# Patient Record
Sex: FEMALE | Race: WHITE | Employment: UNEMPLOYED | URBAN - METROPOLITAN AREA
[De-identification: names, ages, dates, MRNs, and addresses within clinical notes are randomized per-mention and may not be internally consistent; named-entity substitution may affect disease eponyms.]

---

## 2018-10-25 ENCOUNTER — HOSPITAL ENCOUNTER (EMERGENCY)
Age: 33
Discharge: HOME | End: 2018-10-25
Payer: COMMERCIAL

## 2018-10-25 ENCOUNTER — APPOINTMENT (OUTPATIENT)
Dept: GENERAL RADIOLOGY | Age: 33
End: 2018-10-25
Payer: COMMERCIAL

## 2018-10-25 VITALS
OXYGEN SATURATION: 98 % | HEART RATE: 82 BPM | WEIGHT: 293 LBS | HEIGHT: 67 IN | SYSTOLIC BLOOD PRESSURE: 156 MMHG | DIASTOLIC BLOOD PRESSURE: 86 MMHG | TEMPERATURE: 98.6 F | BODY MASS INDEX: 45.99 KG/M2 | RESPIRATION RATE: 16 BRPM

## 2018-10-25 DIAGNOSIS — J40 BRONCHITIS: Primary | ICD-10-CM

## 2018-10-25 LAB
CHP ED QC CHECK: NORMAL
PREGNANCY TEST URINE, POC: NEGATIVE

## 2018-10-25 PROCEDURE — 94640 AIRWAY INHALATION TREATMENT: CPT

## 2018-10-25 PROCEDURE — 71046 X-RAY EXAM CHEST 2 VIEWS: CPT

## 2018-10-25 PROCEDURE — 99283 EMERGENCY DEPT VISIT LOW MDM: CPT

## 2018-10-25 PROCEDURE — 6370000000 HC RX 637 (ALT 250 FOR IP): Performed by: NURSE PRACTITIONER

## 2018-10-25 RX ORDER — ALBUTEROL SULFATE 90 UG/1
2 AEROSOL, METERED RESPIRATORY (INHALATION) EVERY 4 HOURS PRN
Qty: 1 INHALER | Refills: 1 | Status: SHIPPED | OUTPATIENT
Start: 2018-10-25 | End: 2020-12-21 | Stop reason: SDUPTHER

## 2018-10-25 RX ORDER — PREDNISONE 10 MG/1
40 TABLET ORAL DAILY
Qty: 20 TABLET | Refills: 0 | Status: SHIPPED | OUTPATIENT
Start: 2018-10-25 | End: 2018-10-30

## 2018-10-25 RX ORDER — IPRATROPIUM BROMIDE AND ALBUTEROL SULFATE 2.5; .5 MG/3ML; MG/3ML
1 SOLUTION RESPIRATORY (INHALATION) ONCE
Status: COMPLETED | OUTPATIENT
Start: 2018-10-25 | End: 2018-10-25

## 2018-10-25 RX ORDER — PREDNISONE 20 MG/1
60 TABLET ORAL ONCE
Status: COMPLETED | OUTPATIENT
Start: 2018-10-25 | End: 2018-10-25

## 2018-10-25 RX ORDER — BENZONATATE 100 MG/1
100 CAPSULE ORAL 3 TIMES DAILY PRN
Qty: 21 CAPSULE | Refills: 0 | Status: SHIPPED | OUTPATIENT
Start: 2018-10-25 | End: 2018-11-01

## 2018-10-25 RX ADMIN — IPRATROPIUM BROMIDE AND ALBUTEROL SULFATE 1 AMPULE: .5; 3 SOLUTION RESPIRATORY (INHALATION) at 16:28

## 2018-10-25 RX ADMIN — PREDNISONE 60 MG: 20 TABLET ORAL at 16:45

## 2018-10-25 NOTE — ED PROVIDER NOTES
°F (37 °C) Oral 90 16 98 % 5' 7\" (1.702 m) (!) 339 lb 7 oz (154 kg)      Oxygen Saturation Interpretation: Normal.    Constitutional:  Alert, development consistent with age. HEENT:  NC/NT. Airway patent. Neck:  Normal ROM. Supple. Respiratory:  Breath sounds: equal bilaterally. Lung sounds: Expiratory wheezing- upper right and upper left. CV:  Regular rate and rhythm, normal heart sounds, without pathological murmurs, ectopy, gallops, or rubs. .  GI:  Abdomen Soft, nontender, good bowel sounds. No firm or pulsatile mass. Integument:  Normal turgor. Warm, dry, without visible rash. Lymphatic:  Edema:  none Both lower extremity(s). Neurological:  Oriented. Motor functions intact. Lab / Imaging Results   (All laboratory and radiology results have been personally reviewed by myself)  Labs:  Results for orders placed or performed during the hospital encounter of 10/25/18   POC Pregnancy Urine Qual   Result Value Ref Range    Preg Test, Ur negative     QC OK? ok      Imaging: All Radiology results interpreted by Radiologist unless otherwise noted. XR CHEST STANDARD (2 VW)   Final Result   No acute cardiopulmonary disease. ED Course / Medical Decision Making     Medications   ipratropium-albuterol (DUONEB) nebulizer solution 1 ampule (1 ampule Inhalation Given 10/25/18 1628)   predniSONE (DELTASONE) tablet 60 mg (60 mg Oral Given 10/25/18 1645)      1750: Patient's symptoms improved after treatment. No respiratory distress. Consult(s):   None    Procedure(s):   none    Medical Decision Making:    Based on low  suspicion for pneumonia as per history/physical findings, imaging was done and confirms the above findings. Upper respiratory/Bronchitis infection likely viral in etiology. Not hypoxic, nothing to suggest pneumonia. Patient is well appearing, non toxic and appropriate for outpatient management. Plan is for symptom management with PCP follow up.    Plan of Care: Normal progression of disease discussed. All questions answered. Explained the rationale for symptomatic treatment rather than use of an antibiotic. Instruction provided in the use of fluids, vaporizer, acetaminophen, and other OTC medication for symptom control. Extra fluids  Analgesics as needed, dose reviewed. Follow up as needed should symptoms fail to improve. Counseling: The emergency provider has spoken with the patient and spouse/SO and discussed todays results, in addition to providing specific details for the plan of care and counseling regarding the diagnosis and prognosis. Questions are answered at this time and they are agreeable with the plan. Assessment      1. Bronchitis      Plan   Discharge to home  Patient condition is good    New Medications     New Prescriptions    ALBUTEROL SULFATE HFA (PROVENTIL HFA) 108 (90 BASE) MCG/ACT INHALER    Inhale 2 puffs into the lungs every 4 hours as needed for Wheezing    BENZONATATE (TESSALON PERLES) 100 MG CAPSULE    Take 1 capsule by mouth 3 times daily as needed for Cough    PREDNISONE (DELTASONE) 10 MG TABLET    Take 4 tablets by mouth daily for 5 days     Electronically signed by GEORGE Bowles CNP   DD: 10/25/18  **This report was transcribed using voice recognition software. Every effort was made to ensure accuracy; however, inadvertent computerized transcription errors may be present.   END OF ED PROVIDER NOTE     GEORGE Thomas CNP  10/25/18 6798

## 2018-12-14 ENCOUNTER — HOSPITAL ENCOUNTER (EMERGENCY)
Age: 33
Discharge: HOME | End: 2018-12-14
Payer: COMMERCIAL

## 2018-12-14 VITALS
BODY MASS INDEX: 51.69 KG/M2 | RESPIRATION RATE: 20 BRPM | OXYGEN SATURATION: 96 % | TEMPERATURE: 99.3 F | SYSTOLIC BLOOD PRESSURE: 130 MMHG | HEART RATE: 86 BPM | DIASTOLIC BLOOD PRESSURE: 84 MMHG | WEIGHT: 293 LBS

## 2018-12-14 DIAGNOSIS — J03.90 ACUTE TONSILLITIS, UNSPECIFIED ETIOLOGY: Primary | ICD-10-CM

## 2018-12-14 LAB
MONO TEST: NEGATIVE
STREP GRP A PCR: NEGATIVE

## 2018-12-14 PROCEDURE — 87880 STREP A ASSAY W/OPTIC: CPT

## 2018-12-14 PROCEDURE — 86308 HETEROPHILE ANTIBODY SCREEN: CPT

## 2018-12-14 PROCEDURE — 36415 COLL VENOUS BLD VENIPUNCTURE: CPT

## 2018-12-14 PROCEDURE — 99212 OFFICE O/P EST SF 10 MIN: CPT

## 2018-12-14 RX ORDER — AMOXICILLIN 500 MG/1
500 CAPSULE ORAL 3 TIMES DAILY
Qty: 30 CAPSULE | Refills: 0 | Status: SHIPPED | OUTPATIENT
Start: 2018-12-14 | End: 2018-12-24

## 2018-12-14 ASSESSMENT — PAIN SCALES - GENERAL: PAINLEVEL_OUTOF10: 9

## 2018-12-14 ASSESSMENT — PAIN DESCRIPTION - DESCRIPTORS: DESCRIPTORS: DISCOMFORT

## 2018-12-14 ASSESSMENT — PAIN DESCRIPTION - LOCATION: LOCATION: THROAT

## 2018-12-14 ASSESSMENT — PAIN DESCRIPTION - PAIN TYPE: TYPE: ACUTE PAIN

## 2019-10-04 ENCOUNTER — OFFICE VISIT (OUTPATIENT)
Dept: NEUROLOGY | Age: 34
End: 2019-10-04
Payer: MEDICAID

## 2019-10-04 VITALS
HEIGHT: 67 IN | RESPIRATION RATE: 14 BRPM | OXYGEN SATURATION: 98 % | HEART RATE: 61 BPM | DIASTOLIC BLOOD PRESSURE: 86 MMHG | SYSTOLIC BLOOD PRESSURE: 127 MMHG | WEIGHT: 293 LBS | BODY MASS INDEX: 45.99 KG/M2

## 2019-10-04 DIAGNOSIS — R51.9 MIXED HEADACHE: Primary | ICD-10-CM

## 2019-10-04 PROCEDURE — 1036F TOBACCO NON-USER: CPT | Performed by: NURSE PRACTITIONER

## 2019-10-04 PROCEDURE — 99204 OFFICE O/P NEW MOD 45 MIN: CPT | Performed by: NURSE PRACTITIONER

## 2019-10-04 PROCEDURE — G8484 FLU IMMUNIZE NO ADMIN: HCPCS | Performed by: NURSE PRACTITIONER

## 2019-10-04 PROCEDURE — G8417 CALC BMI ABV UP PARAM F/U: HCPCS | Performed by: NURSE PRACTITIONER

## 2019-10-04 PROCEDURE — G8427 DOCREV CUR MEDS BY ELIG CLIN: HCPCS | Performed by: NURSE PRACTITIONER

## 2019-10-04 RX ORDER — GLUCOSAMINE/D3/BOSWELLIA SERRA 1500MG-400
TABLET ORAL
COMMUNITY
End: 2022-04-25 | Stop reason: ALTCHOICE

## 2019-10-04 RX ORDER — FERROUS SULFATE 325(65) MG
325 TABLET ORAL
COMMUNITY
End: 2021-04-22 | Stop reason: ALTCHOICE

## 2019-10-04 RX ORDER — DIVALPROEX SODIUM 125 MG/1
125 TABLET, DELAYED RELEASE ORAL 2 TIMES DAILY
Qty: 60 TABLET | Refills: 0 | Status: SHIPPED | OUTPATIENT
Start: 2019-10-04 | End: 2019-10-04 | Stop reason: SDUPTHER

## 2019-10-04 RX ORDER — ARIPIPRAZOLE 5 MG/1
2.5 TABLET ORAL DAILY
COMMUNITY

## 2019-10-04 RX ORDER — CHOLECALCIFEROL (VITAMIN D3) 125 MCG
500 CAPSULE ORAL DAILY
COMMUNITY
End: 2019-12-06

## 2019-10-04 RX ORDER — DIVALPROEX SODIUM 125 MG/1
125 TABLET, DELAYED RELEASE ORAL 2 TIMES DAILY
Qty: 60 TABLET | Refills: 2 | Status: SHIPPED | OUTPATIENT
Start: 2019-10-04 | End: 2019-12-06 | Stop reason: SDUPTHER

## 2019-10-16 ENCOUNTER — HOSPITAL ENCOUNTER (OUTPATIENT)
Dept: CT IMAGING | Age: 34
Discharge: HOME | End: 2019-10-18
Payer: MEDICAID

## 2019-10-16 DIAGNOSIS — R51.9 MIXED HEADACHE: ICD-10-CM

## 2019-10-16 PROCEDURE — 70450 CT HEAD/BRAIN W/O DYE: CPT

## 2019-10-21 ENCOUNTER — TELEPHONE (OUTPATIENT)
Dept: NEUROLOGY | Age: 34
End: 2019-10-21

## 2019-10-31 LAB — PAP SMEAR, EXTERNAL: NEGATIVE

## 2019-12-06 ENCOUNTER — OFFICE VISIT (OUTPATIENT)
Dept: NEUROLOGY | Age: 34
End: 2019-12-06
Payer: MEDICAID

## 2019-12-06 VITALS
DIASTOLIC BLOOD PRESSURE: 82 MMHG | BODY MASS INDEX: 45.99 KG/M2 | HEIGHT: 67 IN | SYSTOLIC BLOOD PRESSURE: 122 MMHG | OXYGEN SATURATION: 97 % | WEIGHT: 293 LBS | HEART RATE: 69 BPM | RESPIRATION RATE: 15 BRPM

## 2019-12-06 DIAGNOSIS — R51.9 MIXED HEADACHE: Primary | ICD-10-CM

## 2019-12-06 PROCEDURE — G8484 FLU IMMUNIZE NO ADMIN: HCPCS | Performed by: NURSE PRACTITIONER

## 2019-12-06 PROCEDURE — 1036F TOBACCO NON-USER: CPT | Performed by: NURSE PRACTITIONER

## 2019-12-06 PROCEDURE — G8417 CALC BMI ABV UP PARAM F/U: HCPCS | Performed by: NURSE PRACTITIONER

## 2019-12-06 PROCEDURE — G8427 DOCREV CUR MEDS BY ELIG CLIN: HCPCS | Performed by: NURSE PRACTITIONER

## 2019-12-06 PROCEDURE — 99214 OFFICE O/P EST MOD 30 MIN: CPT | Performed by: NURSE PRACTITIONER

## 2019-12-06 RX ORDER — DICYCLOMINE HCL 20 MG
20 TABLET ORAL 2 TIMES DAILY
COMMUNITY
End: 2022-04-25 | Stop reason: ALTCHOICE

## 2019-12-06 RX ORDER — LOPERAMIDE HYDROCHLORIDE 2 MG/1
2 CAPSULE ORAL PRN
COMMUNITY

## 2019-12-06 RX ORDER — FLUOXETINE HYDROCHLORIDE 40 MG/1
40 CAPSULE ORAL DAILY
COMMUNITY

## 2019-12-06 RX ORDER — DIVALPROEX SODIUM 125 MG/1
125 TABLET, DELAYED RELEASE ORAL 2 TIMES DAILY
Qty: 60 TABLET | Refills: 6 | Status: SHIPPED
Start: 2019-12-06 | End: 2020-05-29 | Stop reason: SDUPTHER

## 2020-05-29 ENCOUNTER — OFFICE VISIT (OUTPATIENT)
Dept: NEUROLOGY | Age: 35
End: 2020-05-29
Payer: MEDICAID

## 2020-05-29 VITALS
TEMPERATURE: 97.6 F | BODY MASS INDEX: 45.99 KG/M2 | HEART RATE: 74 BPM | HEIGHT: 67 IN | SYSTOLIC BLOOD PRESSURE: 110 MMHG | WEIGHT: 293 LBS | RESPIRATION RATE: 20 BRPM | DIASTOLIC BLOOD PRESSURE: 76 MMHG | OXYGEN SATURATION: 100 %

## 2020-05-29 PROCEDURE — 1036F TOBACCO NON-USER: CPT | Performed by: NURSE PRACTITIONER

## 2020-05-29 PROCEDURE — G8427 DOCREV CUR MEDS BY ELIG CLIN: HCPCS | Performed by: NURSE PRACTITIONER

## 2020-05-29 PROCEDURE — G8417 CALC BMI ABV UP PARAM F/U: HCPCS | Performed by: NURSE PRACTITIONER

## 2020-05-29 PROCEDURE — 99213 OFFICE O/P EST LOW 20 MIN: CPT | Performed by: NURSE PRACTITIONER

## 2020-05-29 RX ORDER — DIVALPROEX SODIUM 125 MG/1
125 TABLET, DELAYED RELEASE ORAL 2 TIMES DAILY
Qty: 60 TABLET | Refills: 11 | Status: SHIPPED
Start: 2020-05-29 | End: 2021-04-22

## 2020-05-29 NOTE — PROGRESS NOTES
estrogens,    Nifedipine)? no  Exposure to carbon monoxide? no  Substance use: not anymore    Sleep:patient is now sleeping better now with 7-9 hours per night can sleep through the night    No issues with eating, chewing or swallowing.     No chest pain or palpitations  No SOB  No vertigo, lightheadedness or loss of consciousness  No falls, tripping or stumbling  No incontinence of bowels or bladder  No itching or bruising appreciated  No numbness, tingling or focal arm/leg weakness    ROS is otherwise negative    Objective:     Vitals:    05/29/20 1506   BP: 110/76   Site: Right Upper Arm   Position: Sitting   Cuff Size: Large Adult   Pulse: 74   Resp: 20   Temp: 97.6 °F (36.4 °C)   TempSrc: Oral   SpO2: 100%   Weight: (!) 315 lb (142.9 kg)   Height: 5' 7\" (1.702 m)     General appearance: alert, appears stated age, cooperative and in no distress  Head: normocephalic, without obvious abnormality, atraumatic  Eyes: conjunctivae/corneas clear; no drainage  Neck: no adenopathy, supple, symmetrical  Lungs: clear to auscultation bilaterally  Heart: regular rate and rhythm, S1, S2 normal, no murmur  Abdomen: soft, non-tender; bowel sounds normal  Extremities: normal, atraumatic, no cyanosis or edema  Pulses: 2+ and symmetric  Skin:  color, texture, turgor normal--no rashes or lesions      Mental Status: alert and oriented x 4    Appropriate attention/concentration  Intact fundus of knowledge      Speech: no dysarthria  Language: no aphasias    Cranial Nerves:  I: smell    II: visual acuity     II: visual fields Full    II: pupils PATTI   III,VII: ptosis None   III,IV,VI: extraocular muscles  EOMI without nystagmus   V: mastication Normal   V: facial light touch sensation  Normal   V,VII: corneal reflex     VII: facial muscle function - upper  Normal   VII: facial muscle function - lower Normal   VIII: hearing Normal   IX: soft palate elevation  Normal   IX,X: gag reflex    XI: trapezius strength  5/5   XI: sternocleidomastoid strength 5/5   XI: neck extension strength  5/5   XII: tongue strength  Normal     Motor:  5/5 throughout  Normal bulk and tone  No drift   No abnormal movements    Sensory:  LT normal    Coordination:   FN, FFM and GABO normal  HS normal    Gait:  Normal    DTR:   2+ throughout    No Medina's    No other pathological reflexes    Laboratory/Radiology:  ry/Radiology:     No labs to review at this time    CT of head: Negative with no acute findings or abnormalities    Imaging studies were reviewed independently today    Assessment:     Patient with mixed headaches--- migraines without aura and tension headaches  --- No previous history of headaches but with a history of migraines on the maternal side  --- Has was having 3 headaches per week or 12 headaches per month  ---her headaches are bilateral with photophobia, phonophobia, scalp sensitivity but with no nausea/vomiting  --- Started her on Depakote 125 mg twice daily and now with significant decrease in her headaches  --- Now with 0 to 1/2 headache per week  --- No complaints of side effects from Depakote  --- CT of head: Was negative for any acute findings or abnormalities  --- She is medically stable  --- bipolar disorder controlled with antidepressants/antipsychotics  --- Neuro exam was nonfocal    Plan:     Continue Depakote 125 mg BID    Follow-up in 1 year    Call with any questions or concerns      GEORGE Crabtree, HARINIP-C  3:05 PM  5/29/2020

## 2020-12-21 ENCOUNTER — OFFICE VISIT (OUTPATIENT)
Dept: FAMILY MEDICINE CLINIC | Age: 35
End: 2020-12-21
Payer: MEDICAID

## 2020-12-21 VITALS
OXYGEN SATURATION: 98 % | HEART RATE: 98 BPM | HEIGHT: 67 IN | BODY MASS INDEX: 45.99 KG/M2 | TEMPERATURE: 98.5 F | SYSTOLIC BLOOD PRESSURE: 113 MMHG | RESPIRATION RATE: 16 BRPM | WEIGHT: 293 LBS | DIASTOLIC BLOOD PRESSURE: 70 MMHG

## 2020-12-21 PROBLEM — E66.01 CLASS 3 SEVERE OBESITY DUE TO EXCESS CALORIES WITHOUT SERIOUS COMORBIDITY WITH BODY MASS INDEX (BMI) OF 50.0 TO 59.9 IN ADULT (HCC): Status: ACTIVE | Noted: 2020-12-21

## 2020-12-21 PROBLEM — F60.3 BORDERLINE PERSONALITY DISORDER (HCC): Status: ACTIVE | Noted: 2020-12-21

## 2020-12-21 PROBLEM — Z91.09 ENVIRONMENTAL ALLERGIES: Status: ACTIVE | Noted: 2020-12-21

## 2020-12-21 PROBLEM — E66.813 CLASS 3 SEVERE OBESITY DUE TO EXCESS CALORIES WITHOUT SERIOUS COMORBIDITY WITH BODY MASS INDEX (BMI) OF 50.0 TO 59.9 IN ADULT: Status: ACTIVE | Noted: 2020-12-21

## 2020-12-21 PROBLEM — J45.30 MILD PERSISTENT ASTHMA WITHOUT COMPLICATION: Status: ACTIVE | Noted: 2020-12-21

## 2020-12-21 PROCEDURE — G8482 FLU IMMUNIZE ORDER/ADMIN: HCPCS | Performed by: INTERNAL MEDICINE

## 2020-12-21 PROCEDURE — 99203 OFFICE O/P NEW LOW 30 MIN: CPT | Performed by: INTERNAL MEDICINE

## 2020-12-21 PROCEDURE — 1036F TOBACCO NON-USER: CPT | Performed by: INTERNAL MEDICINE

## 2020-12-21 PROCEDURE — 90471 IMMUNIZATION ADMIN: CPT | Performed by: INTERNAL MEDICINE

## 2020-12-21 PROCEDURE — 90686 IIV4 VACC NO PRSV 0.5 ML IM: CPT | Performed by: INTERNAL MEDICINE

## 2020-12-21 PROCEDURE — G8427 DOCREV CUR MEDS BY ELIG CLIN: HCPCS | Performed by: INTERNAL MEDICINE

## 2020-12-21 PROCEDURE — G8431 POS CLIN DEPRES SCRN F/U DOC: HCPCS | Performed by: INTERNAL MEDICINE

## 2020-12-21 PROCEDURE — G8417 CALC BMI ABV UP PARAM F/U: HCPCS | Performed by: INTERNAL MEDICINE

## 2020-12-21 RX ORDER — FEXOFENADINE HCL 180 MG/1
180 TABLET ORAL DAILY
Qty: 90 TABLET | Refills: 1 | Status: SHIPPED | OUTPATIENT
Start: 2020-12-21

## 2020-12-21 RX ORDER — ALBUTEROL SULFATE 90 UG/1
2 AEROSOL, METERED RESPIRATORY (INHALATION) EVERY 4 HOURS PRN
Qty: 1 INHALER | Refills: 1 | Status: SHIPPED
Start: 2020-12-21 | End: 2022-07-25 | Stop reason: SDUPTHER

## 2020-12-21 RX ORDER — BUDESONIDE AND FORMOTEROL FUMARATE DIHYDRATE 160; 4.5 UG/1; UG/1
2 AEROSOL RESPIRATORY (INHALATION) 2 TIMES DAILY
Qty: 3 INHALER | Refills: 1 | Status: SHIPPED
Start: 2020-12-21 | End: 2022-07-25 | Stop reason: SDUPTHER

## 2020-12-21 SDOH — ECONOMIC STABILITY: TRANSPORTATION INSECURITY
IN THE PAST 12 MONTHS, HAS LACK OF TRANSPORTATION KEPT YOU FROM MEETINGS, WORK, OR FROM GETTING THINGS NEEDED FOR DAILY LIVING?: YES

## 2020-12-21 SDOH — ECONOMIC STABILITY: FOOD INSECURITY: WITHIN THE PAST 12 MONTHS, YOU WORRIED THAT YOUR FOOD WOULD RUN OUT BEFORE YOU GOT MONEY TO BUY MORE.: SOMETIMES TRUE

## 2020-12-21 SDOH — ECONOMIC STABILITY: FOOD INSECURITY: WITHIN THE PAST 12 MONTHS, THE FOOD YOU BOUGHT JUST DIDN'T LAST AND YOU DIDN'T HAVE MONEY TO GET MORE.: NEVER TRUE

## 2020-12-21 SDOH — ECONOMIC STABILITY: INCOME INSECURITY: HOW HARD IS IT FOR YOU TO PAY FOR THE VERY BASICS LIKE FOOD, HOUSING, MEDICAL CARE, AND HEATING?: SOMEWHAT HARD

## 2020-12-21 SDOH — ECONOMIC STABILITY: TRANSPORTATION INSECURITY
IN THE PAST 12 MONTHS, HAS THE LACK OF TRANSPORTATION KEPT YOU FROM MEDICAL APPOINTMENTS OR FROM GETTING MEDICATIONS?: NO

## 2020-12-21 ASSESSMENT — ENCOUNTER SYMPTOMS
WHEEZING: 1
COUGH: 0
VOMITING: 0
COLOR CHANGE: 0
DIARRHEA: 0
TROUBLE SWALLOWING: 0
NAUSEA: 0
SHORTNESS OF BREATH: 1
SORE THROAT: 0

## 2020-12-21 ASSESSMENT — PATIENT HEALTH QUESTIONNAIRE - PHQ9
5. POOR APPETITE OR OVEREATING: 3
2. FEELING DOWN, DEPRESSED OR HOPELESS: 3
10. IF YOU CHECKED OFF ANY PROBLEMS, HOW DIFFICULT HAVE THESE PROBLEMS MADE IT FOR YOU TO DO YOUR WORK, TAKE CARE OF THINGS AT HOME, OR GET ALONG WITH OTHER PEOPLE: 3
7. TROUBLE CONCENTRATING ON THINGS, SUCH AS READING THE NEWSPAPER OR WATCHING TELEVISION: 3
SUM OF ALL RESPONSES TO PHQ9 QUESTIONS 1 & 2: 4
SUM OF ALL RESPONSES TO PHQ QUESTIONS 1-9: 21
SUM OF ALL RESPONSES TO PHQ QUESTIONS 1-9: 22
6. FEELING BAD ABOUT YOURSELF - OR THAT YOU ARE A FAILURE OR HAVE LET YOURSELF OR YOUR FAMILY DOWN: 3
3. TROUBLE FALLING OR STAYING ASLEEP: 3
1. LITTLE INTEREST OR PLEASURE IN DOING THINGS: 1
8. MOVING OR SPEAKING SO SLOWLY THAT OTHER PEOPLE COULD HAVE NOTICED. OR THE OPPOSITE, BEING SO FIGETY OR RESTLESS THAT YOU HAVE BEEN MOVING AROUND A LOT MORE THAN USUAL: 2
9. THOUGHTS THAT YOU WOULD BE BETTER OFF DEAD, OR OF HURTING YOURSELF: 1
SUM OF ALL RESPONSES TO PHQ QUESTIONS 1-9: 22
4. FEELING TIRED OR HAVING LITTLE ENERGY: 3

## 2020-12-21 ASSESSMENT — COLUMBIA-SUICIDE SEVERITY RATING SCALE - C-SSRS
1. WITHIN THE PAST MONTH, HAVE YOU WISHED YOU WERE DEAD OR WISHED YOU COULD GO TO SLEEP AND NOT WAKE UP?: NO
6. HAVE YOU EVER DONE ANYTHING, STARTED TO DO ANYTHING, OR PREPARED TO DO ANYTHING TO END YOUR LIFE?: NO
2. HAVE YOU ACTUALLY HAD ANY THOUGHTS OF KILLING YOURSELF?: NO

## 2020-12-21 NOTE — PROGRESS NOTES
12/21/2020    Chief Complaint   Patient presents with    Establish Care    Asthma     getting worse since last 4-5 mos       HPI  Shortness of breath and wheezing. Not SOB at rest. Using albuterol MDI 2-3 days out of week. Dyspnea is mild last minutes brought on by walking relieved by rest and albuterol and has been occurring over the past few months. Has environmental allergies controlled w/ Allegra but has not had money lately to purchase the medication so she has had a lot of sneezing. She has a history of borderline personality disorder and is not aware of whether she has a diagnosis of depression but she is to see a psychiatrist within the week to reestablish and get back on her medications. 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 She would do a lot of walking on the job and is no longer doing that. Review of Systems   Constitutional: Negative for chills, diaphoresis, fever and unexpected weight change (wt gain due to sedentary life after job loss). HENT: Negative for sore throat and trouble swallowing. Respiratory: Positive for shortness of breath and wheezing. Negative for cough. Cardiovascular: Positive for leg swelling. Negative for chest pain. Gastrointestinal: Negative for diarrhea, nausea and vomiting. Genitourinary: Negative for hematuria. Musculoskeletal: Negative for gait problem and joint swelling. Skin: Negative for color change and wound. Neurological: Negative for syncope, facial asymmetry and light-headedness. Hematological: Negative for adenopathy. Psychiatric/Behavioral: Negative for confusion, decreased concentration and self-injury.        Past Medical History:   Diagnosis Date    Asthma     Personality disorder ([facility] [address]        Social History     Tobacco Use    Smoking status: Former Smoker    Smokeless tobacco: Never Used   Substance Use Topics    Alcohol use: Yes     Comment: rare     Social History     Substance and Sexual Activity   Drug Use No       Past surgical, family, social history reviewed and updated. /70   Pulse 98   Temp 98.5 °F (36.9 °C) (Temporal)   Resp 16   Ht 5' 7\" (1.702 m)   Wt (!) 365 lb 9.6 oz (165.8 kg)   LMP 12/17/2020 (Exact Date)   SpO2 98%   BMI 57.26 kg/m²     Physical Exam  Vitals signs reviewed. Constitutional:       General: She is not in acute distress. Appearance: Normal appearance. She is not diaphoretic. HENT:      Mouth/Throat:      Mouth: Mucous membranes are moist.      Pharynx: Oropharynx is clear. No oropharyngeal exudate or posterior oropharyngeal erythema. Eyes:      General: No scleral icterus. Conjunctiva/sclera: Conjunctivae normal.   Neck:      Thyroid: No thyromegaly. Vascular: No JVD (at 45 degrees inclination). Cardiovascular:      Rate and Rhythm: Normal rate and regular rhythm. Heart sounds: Normal heart sounds. No murmur. No friction rub. No gallop. Pulmonary:      Breath sounds: Normal breath sounds. No stridor. No wheezing, rhonchi or rales. Abdominal:      General: There is no abdominal bruit. Musculoskeletal:      Right lower leg: Edema present. Left lower leg: Edema present. Comments: nonpitting   Skin:     General: Skin is warm and dry. Coloration: Skin is not jaundiced. Neurological:      General: No focal deficit present. Mental Status: She is alert and oriented to person, place, and time. Gait: Gait normal.   Psychiatric:         Thought Content: Thought content normal.       1. Mild persistent asthma without complication  Not controlled. Begin Symbicort as needed  - budesonide-formoterol (SYMBICORT) 160-4.5 MCG/ACT AERO; Inhale 2 puffs into the lungs 2 times daily  Dispense: 3 Inhaler; Refill: 1  - albuterol sulfate HFA (PROVENTIL HFA) 108 (90 Base) MCG/ACT inhaler;  Inhale 2 puffs into the lungs every 4 hours as needed for Wheezing  Dispense: 1

## 2020-12-23 DIAGNOSIS — E66.01 CLASS 3 SEVERE OBESITY DUE TO EXCESS CALORIES WITHOUT SERIOUS COMORBIDITY WITH BODY MASS INDEX (BMI) OF 50.0 TO 59.9 IN ADULT (HCC): ICD-10-CM

## 2020-12-23 DIAGNOSIS — J45.30 MILD PERSISTENT ASTHMA WITHOUT COMPLICATION: ICD-10-CM

## 2020-12-23 LAB
GLUCOSE FASTING: 86 MG/DL (ref 74–99)
TSH SERPL DL<=0.05 MIU/L-ACNC: 1.47 UIU/ML (ref 0.27–4.2)

## 2021-01-04 ENCOUNTER — TELEPHONE (OUTPATIENT)
Dept: FAMILY MEDICINE CLINIC | Age: 36
End: 2021-01-04

## 2021-01-22 ENCOUNTER — VIRTUAL VISIT (OUTPATIENT)
Dept: FAMILY MEDICINE CLINIC | Age: 36
End: 2021-01-22
Payer: MEDICAID

## 2021-01-22 DIAGNOSIS — Z91.09 ENVIRONMENTAL ALLERGIES: ICD-10-CM

## 2021-01-22 DIAGNOSIS — J45.30 MILD PERSISTENT ASTHMA WITHOUT COMPLICATION: Primary | ICD-10-CM

## 2021-01-22 DIAGNOSIS — E66.01 CLASS 3 SEVERE OBESITY DUE TO EXCESS CALORIES WITHOUT SERIOUS COMORBIDITY WITH BODY MASS INDEX (BMI) OF 50.0 TO 59.9 IN ADULT (HCC): ICD-10-CM

## 2021-01-22 PROCEDURE — 99442 PR PHYS/QHP TELEPHONE EVALUATION 11-20 MIN: CPT | Performed by: INTERNAL MEDICINE

## 2021-01-22 ASSESSMENT — ENCOUNTER SYMPTOMS
COUGH: 0
CHEST TIGHTNESS: 0
SHORTNESS OF BREATH: 0
WHEEZING: 0

## 2021-01-22 NOTE — PROGRESS NOTES
2021    TELEHEALTH EVALUATION -- Audio/Visual (During [facility]-46 public health emergency)    HPI:    Zamzam Erazo (:  1985) has requested an audio/video evaluation for the following concern(s):    Asthma  Feeling much better w/ Symbicort. No day or nightime asthma symptoms. Last used albuterol 1 wk ago. Reconnected w/ her psychiatrist and doing well. Not getting allergy medication due to cost and so is having some postnasal drip and congestion. Review of Systems   Respiratory: Negative for cough, chest tightness, shortness of breath and wheezing. Cardiovascular: Negative for chest pain. Prior to Visit Medications    Medication Sig Taking?  Authorizing Provider   budesonide-formoterol (SYMBICORT) 160-4.5 MCG/ACT AERO Inhale 2 puffs into the lungs 2 times daily Yes Anna Bradford MD   albuterol sulfate HFA (PROVENTIL HFA) 108 (90 Base) MCG/ACT inhaler Inhale 2 puffs into the lungs every 4 hours as needed for Wheezing Yes Anna Bradford MD   divalproex (DEPAKOTE) 125 MG DR tablet Take 1 tablet by mouth 2 times daily Yes Jv Shah, APRN - CNP   FLUoxetine (PROZAC) 40 MG capsule Take 20 mg by mouth daily  Yes Historical Provider, MD   dicyclomine (BENTYL) 20 MG tablet Take 20 mg by mouth 2 times daily Yes Historical Provider, MD   loperamide (IMODIUM) 2 MG capsule Take 2 mg by mouth as needed for Diarrhea Yes Historical Provider, MD   ARIPiprazole (ABILIFY) 5 MG tablet Take 5 mg by mouth daily Yes Historical Provider, MD   Biotin 71002 MCG TABS Take by mouth Yes Historical Provider, MD   fexofenadine (ALLEGRA) 180 MG tablet Take 1 tablet by mouth daily  Patient not taking: Reported on 2021  Anna Bradford MD   ferrous sulfate 325 (65 Fe) MG tablet Take 325 mg by mouth daily (with breakfast)  Historical Provider, MD       Social History     Tobacco Use    Smoking status: Former Smoker    Smokeless tobacco: Never Used   Substance Use Topics She has not yet resumed daily walking like she had planned once asthma is controlled, but she states she will.      return 3 months, office    Shankar Mullins is a 37 y.o. female being evaluated by a Virtual Visit (video visit) encounter to address concerns as mentioned above. A caregiver was present when appropriate. Due to this being a TeleHealth encounter (During [facility] public health emergency), evaluation of the following organ systems was limited: Vitals/Constitutional/EENT/Resp/CV/GI//MS/Neuro/Skin/Heme-Lymph-Imm. Pursuant to the emergency declaration under the [address] [place] [address], [address] [place] [address] and the [employer] and [employer] Act, this Virtual Visit was conducted with patient's (and/or legal guardian's) consent, to reduce the patient's risk of exposure to COVID-19 and provide necessary medical care. The patient (and/or legal guardian) has also been advised to contact this office for worsening conditions or problems, and seek emergency medical treatment and/or call 911 if deemed necessary. Patient identification was verified at the start of the visit: Yes    Total time spent on this encounter: 11 min    Services were provided through a video synchronous discussion virtually to substitute for in-person clinic visit. Patient and provider were located at their individual homes. --Eneida Monroe MD on 1/22/2021 at 10:18 AM    An electronic signature was used to authenticate this note.

## 2021-04-22 ENCOUNTER — OFFICE VISIT (OUTPATIENT)
Dept: FAMILY MEDICINE CLINIC | Age: 36
End: 2021-04-22
Payer: MEDICAID

## 2021-04-22 VITALS
DIASTOLIC BLOOD PRESSURE: 70 MMHG | RESPIRATION RATE: 16 BRPM | BODY MASS INDEX: 45.99 KG/M2 | SYSTOLIC BLOOD PRESSURE: 118 MMHG | HEART RATE: 99 BPM | OXYGEN SATURATION: 99 % | TEMPERATURE: 97.7 F | WEIGHT: 293 LBS | HEIGHT: 67 IN

## 2021-04-22 DIAGNOSIS — E66.01 CLASS 3 SEVERE OBESITY DUE TO EXCESS CALORIES WITHOUT SERIOUS COMORBIDITY WITH BODY MASS INDEX (BMI) OF 50.0 TO 59.9 IN ADULT (HCC): Primary | ICD-10-CM

## 2021-04-22 DIAGNOSIS — J45.30 MILD PERSISTENT ASTHMA WITHOUT COMPLICATION: ICD-10-CM

## 2021-04-22 PROCEDURE — 1036F TOBACCO NON-USER: CPT | Performed by: INTERNAL MEDICINE

## 2021-04-22 PROCEDURE — G8427 DOCREV CUR MEDS BY ELIG CLIN: HCPCS | Performed by: INTERNAL MEDICINE

## 2021-04-22 PROCEDURE — G8417 CALC BMI ABV UP PARAM F/U: HCPCS | Performed by: INTERNAL MEDICINE

## 2021-04-22 PROCEDURE — 99213 OFFICE O/P EST LOW 20 MIN: CPT | Performed by: INTERNAL MEDICINE

## 2021-04-22 ASSESSMENT — PATIENT HEALTH QUESTIONNAIRE - PHQ9
SUM OF ALL RESPONSES TO PHQ9 QUESTIONS 1 & 2: 2
SUM OF ALL RESPONSES TO PHQ QUESTIONS 1-9: 2
2. FEELING DOWN, DEPRESSED OR HOPELESS: 1

## 2021-04-22 NOTE — PROGRESS NOTES
4/22/2021  Visit type: Established patient    Reason for Visit: Follow-up (asthma) and Weight Loss (would like to discuss)      ASSESSMENT AND PLAN      1. Class 3 severe obesity due to excess calories without serious comorbidity with body mass index (BMI) of 50.0 to 59.9 in adult [facility])  Discussed options including return to Weight [employer], Noom or other ashley, medication, medical or surgical weight loss program referral, nutritionist. She prefers latter to start with. Suggested she avoid presweetened drinks. - Amb Referral to Nutrition Services    2. Mild persistent asthma without complication  Stable- continue present management. Recommend COVID vaccine, provided phone #.    ----------------------------------------------------------------------    SUBJECTIVE    Chief Complaint   Patient presents with    Follow-up     asthma    Weight Loss     would like to discuss     HPI   Asthma:  Using albuterol once weekly. No nocturnal symptoms. She is frustrated by her weight. Weight Watchers helped in past but cost is a problem. Lives with boyfriend and they eat too much restaurant food and drink presweetened drinks. Review of Systems   Denies fever, chills, chest pain, and shortness of breath     OBJECTIVE    /70   Pulse 99   Temp 97.7 °F (36.5 °C) (Temporal)   Resp 16   Ht 5' 7\" (1.702 m)   Wt (!) 386 lb 14.4 oz (175.5 kg)   LMP 04/06/2021 (Exact Date)   SpO2 99%   BMI 60.60 kg/m²   Physical Exam  Vitals signs reviewed. Constitutional:       General: She is not in acute distress. Pulmonary:      Effort: No respiratory distress.    Neurological:      Mental Status: She is alert.          ---------------------------------------------------------------------------

## 2021-10-12 ENCOUNTER — OFFICE VISIT (OUTPATIENT)
Dept: FAMILY MEDICINE CLINIC | Age: 36
End: 2021-10-12
Payer: MEDICAID

## 2021-10-12 VITALS
OXYGEN SATURATION: 98 % | HEIGHT: 67 IN | HEART RATE: 76 BPM | BODY MASS INDEX: 45.99 KG/M2 | DIASTOLIC BLOOD PRESSURE: 72 MMHG | SYSTOLIC BLOOD PRESSURE: 123 MMHG | TEMPERATURE: 97.5 F | WEIGHT: 293 LBS | RESPIRATION RATE: 16 BRPM

## 2021-10-12 DIAGNOSIS — F60.3 BORDERLINE PERSONALITY DISORDER (HCC): ICD-10-CM

## 2021-10-12 DIAGNOSIS — E66.01 CLASS 3 SEVERE OBESITY DUE TO EXCESS CALORIES WITHOUT SERIOUS COMORBIDITY WITH BODY MASS INDEX (BMI) OF 50.0 TO 59.9 IN ADULT (HCC): ICD-10-CM

## 2021-10-12 DIAGNOSIS — Z23 FLU VACCINE NEED: ICD-10-CM

## 2021-10-12 DIAGNOSIS — K21.9 GASTROESOPHAGEAL REFLUX DISEASE, UNSPECIFIED WHETHER ESOPHAGITIS PRESENT: Primary | ICD-10-CM

## 2021-10-12 DIAGNOSIS — J45.30 MILD PERSISTENT ASTHMA WITHOUT COMPLICATION: ICD-10-CM

## 2021-10-12 PROCEDURE — 99214 OFFICE O/P EST MOD 30 MIN: CPT | Performed by: INTERNAL MEDICINE

## 2021-10-12 PROCEDURE — 90471 IMMUNIZATION ADMIN: CPT | Performed by: INTERNAL MEDICINE

## 2021-10-12 PROCEDURE — 90674 CCIIV4 VAC NO PRSV 0.5 ML IM: CPT | Performed by: INTERNAL MEDICINE

## 2021-10-12 PROCEDURE — G8417 CALC BMI ABV UP PARAM F/U: HCPCS | Performed by: INTERNAL MEDICINE

## 2021-10-12 PROCEDURE — G8482 FLU IMMUNIZE ORDER/ADMIN: HCPCS | Performed by: INTERNAL MEDICINE

## 2021-10-12 PROCEDURE — G8427 DOCREV CUR MEDS BY ELIG CLIN: HCPCS | Performed by: INTERNAL MEDICINE

## 2021-10-12 PROCEDURE — 1036F TOBACCO NON-USER: CPT | Performed by: INTERNAL MEDICINE

## 2021-10-12 RX ORDER — HYDROXYZINE HYDROCHLORIDE 25 MG/1
TABLET, FILM COATED ORAL
COMMUNITY
Start: 2021-09-13

## 2021-10-12 NOTE — PROGRESS NOTES
10/12/2021  Visit type: Established patient    Reason for Visit: Blood Sugar Problem (nausea, dizziness, fatigue, happens a lot in AM), Arthritis (knee, hips ), Foot Pain (heel spur causing pain also having ankle pain), and Flu Vaccine (given)      ASSESSMENT AND PLAN      1. Gastroesophageal reflux disease, unspecified whether esophagitis present  Recommend trial of famotidine. 2. Class 3 severe obesity due to excess calories without serious comorbidity with body mass index (BMI) of 50.0 to 59.9 in adult [facility])  Assessment & Plan:  She did not get an appointment with nutritionist and is now to call to schedule at [facility]  Orders:  -     Glucose, Fasting; Future  -     Lipid Panel; Future  3. Flu vaccine need  -     INFLUENZA, MDCK QUADV, 2 YRS AND OLDER, IM, PF, PREFILL SYR OR SDV, 0.5ML (FLUCELVAX QUADV, PF)  4. Borderline personality disorder [facility])  Assessment & Plan:   Stable under care of psychiatrist  5. Mild persistent asthma without complication  Assessment & Plan:   Stable continue present management           ----------------------------------------------------------------------    SUBJECTIVE    Chief Complaint   Patient presents with    Blood Sugar Problem     nausea, dizziness, fatigue, happens a lot in AM    Arthritis     knee, hips     Foot Pain     heel spur causing pain also having ankle pain    Flu Vaccine     given     HPI   Complains of feeling dizzy nauseaated and fatigued particularly  in AM.   Emesis 5x/week for 4 months. Admits to heartburn and occasionally takes omeprazole. Now has emotional support animal.  Typical day is  2-5k steps which is an improvement. Review of Systems   Denies fever, chills, chest pain, and shortness of breath, weight loss.      OBJECTIVE    /72 (Site: Left Lower Arm, Position: Sitting, Cuff Size: Medium Adult)   Pulse 76   Temp 97.5 °F (36.4 °C) (Temporal)   Resp 16   Ht 5' 7\" (1.702 m)   Wt (!) 370 lb 9.6 oz (168.1 kg)   LMP 10/06/2021 (Exact Date)   SpO2 98%   BMI 58.04 kg/m²   Physical Exam  Constitutional:       General: She is not in acute distress. HENT:      Mouth/Throat:      Pharynx: Oropharynx is clear. Cardiovascular:      Rate and Rhythm: Normal rate and regular rhythm. Heart sounds: Normal heart sounds. No murmur heard. No friction rub. No gallop. Pulmonary:      Breath sounds: Normal breath sounds. No stridor. No wheezing, rhonchi or rales. Musculoskeletal:      Right lower leg: No edema. Left lower leg: No edema. Neurological:      General: No focal deficit present.       Mental Status: She is alert.         ---------------------------------------------------------------------------

## 2021-10-12 NOTE — PATIENT INSTRUCTIONS
Please call [phone] to schedule your nutritionist visit at [facility]. Try over the counter famotidine as direct for stomach symptoms.

## 2021-10-12 NOTE — ASSESSMENT & PLAN NOTE
She did not get an appointment with nutritionist and is now to call to schedule at 0722 Mel[address]

## 2022-03-04 ENCOUNTER — TELEPHONE (OUTPATIENT)
Dept: FAMILY MEDICINE CLINIC | Age: 37
End: 2022-03-04

## 2022-03-04 NOTE — TELEPHONE ENCOUNTER
----- Message from Sybil Yun sent at 3/4/2022  8:17 AM EST -----  Subject: Appointment Request    Reason for Call: Routine Existing Condition Follow Up    QUESTIONS  Type of Appointment? Established Patient  Reason for appointment request? Available appointments did not meet   patient need  Additional Information for Provider? pt is wanting to schedule an appt   with a new pcp   ---------------------------------------------------------------------------  --------------  CALL BACK INFO  What is the best way for the office to contact you? OK to leave message on   voicemail  Preferred Call Back Phone Number? [phone]  ---------------------------------------------------------------------------  --------------  SCRIPT ANSWERS  Relationship to Patient? Self  Is this follow up request related to routine Diabetes Management? No  Have you been diagnosed with, awaiting test results for, or told that you   are suspected of having COVID-19 (Coronavirus)? (If patient has tested   negative or was tested as a requirement for work, school, or travel and   not based on symptoms, answer no)? No  Within the past 10 days have you developed any of the following symptoms   (answer no if symptoms have been present longer than 10 days or began   more than 10 days ago)? Fever or Chills, Cough, Shortness of breath or   difficulty breathing, Loss of taste or smell, Sore throat, Nasal   congestion, Sneezing or runny nose, Fatigue or generalized body aches   (answer no if pain is specific to a body part e.g. back pain), Diarrhea,   Headache? No  Have you had close contact with someone with COVID-19 in the last 7 days? No  (Service Expert - click yes below to proceed with [email or web address] As Usual   Scheduling)?  Yes

## 2022-04-25 ENCOUNTER — OFFICE VISIT (OUTPATIENT)
Dept: FAMILY MEDICINE CLINIC | Age: 37
End: 2022-04-25
Payer: MEDICAID

## 2022-04-25 VITALS
RESPIRATION RATE: 16 BRPM | SYSTOLIC BLOOD PRESSURE: 132 MMHG | BODY MASS INDEX: 45.99 KG/M2 | OXYGEN SATURATION: 98 % | HEART RATE: 74 BPM | HEIGHT: 67 IN | WEIGHT: 293 LBS | DIASTOLIC BLOOD PRESSURE: 78 MMHG | TEMPERATURE: 96.5 F

## 2022-04-25 DIAGNOSIS — J45.30 MILD PERSISTENT ASTHMA WITHOUT COMPLICATION: ICD-10-CM

## 2022-04-25 DIAGNOSIS — E66.01 CLASS 3 SEVERE OBESITY DUE TO EXCESS CALORIES WITHOUT SERIOUS COMORBIDITY WITH BODY MASS INDEX (BMI) OF 60.0 TO 69.9 IN ADULT (HCC): ICD-10-CM

## 2022-04-25 DIAGNOSIS — F60.3 BORDERLINE PERSONALITY DISORDER (HCC): Primary | ICD-10-CM

## 2022-04-25 DIAGNOSIS — Z83.49 FH: THYROID DISEASE: ICD-10-CM

## 2022-04-25 DIAGNOSIS — Z72.53 HIGH RISK BISEXUAL BEHAVIOR: ICD-10-CM

## 2022-04-25 DIAGNOSIS — Z83.3 FH: DIABETES MELLITUS: ICD-10-CM

## 2022-04-25 PROCEDURE — 1036F TOBACCO NON-USER: CPT | Performed by: NURSE PRACTITIONER

## 2022-04-25 PROCEDURE — G8417 CALC BMI ABV UP PARAM F/U: HCPCS | Performed by: NURSE PRACTITIONER

## 2022-04-25 PROCEDURE — 99214 OFFICE O/P EST MOD 30 MIN: CPT | Performed by: NURSE PRACTITIONER

## 2022-04-25 PROCEDURE — G8427 DOCREV CUR MEDS BY ELIG CLIN: HCPCS | Performed by: NURSE PRACTITIONER

## 2022-04-25 SDOH — ECONOMIC STABILITY: INCOME INSECURITY: IN THE LAST 12 MONTHS, WAS THERE A TIME WHEN YOU WERE NOT ABLE TO PAY THE MORTGAGE OR RENT ON TIME?: NO

## 2022-04-25 SDOH — ECONOMIC STABILITY: HOUSING INSECURITY
IN THE LAST 12 MONTHS, WAS THERE A TIME WHEN YOU DID NOT HAVE A STEADY PLACE TO SLEEP OR SLEPT IN A SHELTER (INCLUDING NOW)?: NO

## 2022-04-25 SDOH — ECONOMIC STABILITY: FOOD INSECURITY: WITHIN THE PAST 12 MONTHS, YOU WORRIED THAT YOUR FOOD WOULD RUN OUT BEFORE YOU GOT MONEY TO BUY MORE.: NEVER TRUE

## 2022-04-25 SDOH — ECONOMIC STABILITY: FOOD INSECURITY: WITHIN THE PAST 12 MONTHS, THE FOOD YOU BOUGHT JUST DIDN'T LAST AND YOU DIDN'T HAVE MONEY TO GET MORE.: NEVER TRUE

## 2022-04-25 ASSESSMENT — ENCOUNTER SYMPTOMS
SINUS PAIN: 0
VOICE CHANGE: 0
NAUSEA: 1
CONSTIPATION: 0
FACIAL SWELLING: 0
COLOR CHANGE: 0
VOMITING: 0
SHORTNESS OF BREATH: 0
COUGH: 0
ABDOMINAL PAIN: 0
SINUS PRESSURE: 0
WHEEZING: 0
CHEST TIGHTNESS: 0
RHINORRHEA: 0
SORE THROAT: 0
DIARRHEA: 0
TROUBLE SWALLOWING: 0
BACK PAIN: 0

## 2022-04-25 ASSESSMENT — PATIENT HEALTH QUESTIONNAIRE - PHQ9
10. IF YOU CHECKED OFF ANY PROBLEMS, HOW DIFFICULT HAVE THESE PROBLEMS MADE IT FOR YOU TO DO YOUR WORK, TAKE CARE OF THINGS AT HOME, OR GET ALONG WITH OTHER PEOPLE: 0
5. POOR APPETITE OR OVEREATING: 0
7. TROUBLE CONCENTRATING ON THINGS, SUCH AS READING THE NEWSPAPER OR WATCHING TELEVISION: 0
3. TROUBLE FALLING OR STAYING ASLEEP: 0
SUM OF ALL RESPONSES TO PHQ QUESTIONS 1-9: 0
SUM OF ALL RESPONSES TO PHQ QUESTIONS 1-9: 0
1. LITTLE INTEREST OR PLEASURE IN DOING THINGS: 0
8. MOVING OR SPEAKING SO SLOWLY THAT OTHER PEOPLE COULD HAVE NOTICED. OR THE OPPOSITE, BEING SO FIGETY OR RESTLESS THAT YOU HAVE BEEN MOVING AROUND A LOT MORE THAN USUAL: 0
SUM OF ALL RESPONSES TO PHQ QUESTIONS 1-9: 0
4. FEELING TIRED OR HAVING LITTLE ENERGY: 0
6. FEELING BAD ABOUT YOURSELF - OR THAT YOU ARE A FAILURE OR HAVE LET YOURSELF OR YOUR FAMILY DOWN: 0
SUM OF ALL RESPONSES TO PHQ9 QUESTIONS 1 & 2: 0
SUM OF ALL RESPONSES TO PHQ QUESTIONS 1-9: 0
2. FEELING DOWN, DEPRESSED OR HOPELESS: 0
9. THOUGHTS THAT YOU WOULD BE BETTER OFF DEAD, OR OF HURTING YOURSELF: 0

## 2022-04-25 ASSESSMENT — SOCIAL DETERMINANTS OF HEALTH (SDOH): HOW HARD IS IT FOR YOU TO PAY FOR THE VERY BASICS LIKE FOOD, HOUSING, MEDICAL CARE, AND HEATING?: NOT HARD AT ALL

## 2022-04-25 ASSESSMENT — LIFESTYLE VARIABLES
HOW MANY STANDARD DRINKS CONTAINING ALCOHOL DO YOU HAVE ON A TYPICAL DAY: 1 OR 2
HOW OFTEN DO YOU HAVE A DRINK CONTAINING ALCOHOL: MONTHLY OR LESS

## 2022-04-25 NOTE — ASSESSMENT & PLAN NOTE
Well controlled continue symbicort and albuterol, allegra, try to avoid triggers has had immunotherapy in the past.

## 2022-04-25 NOTE — ASSESSMENT & PLAN NOTE
Worsening discussed options for weight loss, referral to endocrinologist with gastric bypass group. She has done [facility] in the past. Discussed myfitness pal for tracking.
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
## 2022-04-26 DIAGNOSIS — Z83.49 FH: THYROID DISEASE: ICD-10-CM

## 2022-04-26 DIAGNOSIS — Z72.53 HIGH RISK BISEXUAL BEHAVIOR: ICD-10-CM

## 2022-04-26 DIAGNOSIS — Z83.3 FH: DIABETES MELLITUS: ICD-10-CM

## 2022-04-26 DIAGNOSIS — E66.01 CLASS 3 SEVERE OBESITY DUE TO EXCESS CALORIES WITHOUT SERIOUS COMORBIDITY WITH BODY MASS INDEX (BMI) OF 60.0 TO 69.9 IN ADULT (HCC): ICD-10-CM

## 2022-04-26 LAB
ALBUMIN SERPL-MCNC: 4.1 G/DL (ref 3.5–5.2)
ALP BLD-CCNC: 100 U/L (ref 35–104)
ALT SERPL-CCNC: 16 U/L (ref 0–32)
ANION GAP SERPL CALCULATED.3IONS-SCNC: 16 MMOL/L (ref 7–16)
AST SERPL-CCNC: 22 U/L (ref 0–31)
BASOPHILS ABSOLUTE: 0.03 E9/L (ref 0–0.2)
BASOPHILS RELATIVE PERCENT: 0.5 % (ref 0–2)
BILIRUB SERPL-MCNC: 0.7 MG/DL (ref 0–1.2)
BUN BLDV-MCNC: 9 MG/DL (ref 6–20)
CALCIUM SERPL-MCNC: 9.2 MG/DL (ref 8.6–10.2)
CHLORIDE BLD-SCNC: 103 MMOL/L (ref 98–107)
CHOLESTEROL, TOTAL: 186 MG/DL (ref 0–199)
CO2: 21 MMOL/L (ref 22–29)
CREAT SERPL-MCNC: 0.8 MG/DL (ref 0.5–1)
EOSINOPHILS ABSOLUTE: 0.05 E9/L (ref 0.05–0.5)
EOSINOPHILS RELATIVE PERCENT: 0.8 % (ref 0–6)
GFR AFRICAN AMERICAN: >60
GFR NON-AFRICAN AMERICAN: >60 ML/MIN/1.73
GLUCOSE BLD-MCNC: 87 MG/DL (ref 74–99)
HBA1C MFR BLD: 4.9 % (ref 4–5.6)
HCT VFR BLD CALC: 39 % (ref 34–48)
HDLC SERPL-MCNC: 55 MG/DL
HEMOGLOBIN: 11.8 G/DL (ref 11.5–15.5)
IMMATURE GRANULOCYTES #: 0.01 E9/L
IMMATURE GRANULOCYTES %: 0.2 % (ref 0–5)
LDL CHOLESTEROL CALCULATED: 112 MG/DL (ref 0–99)
LYMPHOCYTES ABSOLUTE: 1.95 E9/L (ref 1.5–4)
LYMPHOCYTES RELATIVE PERCENT: 30 % (ref 20–42)
MCH RBC QN AUTO: 25.3 PG (ref 26–35)
MCHC RBC AUTO-ENTMCNC: 30.3 % (ref 32–34.5)
MCV RBC AUTO: 83.7 FL (ref 80–99.9)
MONOCYTES ABSOLUTE: 0.28 E9/L (ref 0.1–0.95)
MONOCYTES RELATIVE PERCENT: 4.3 % (ref 2–12)
NEUTROPHILS ABSOLUTE: 4.19 E9/L (ref 1.8–7.3)
NEUTROPHILS RELATIVE PERCENT: 64.2 % (ref 43–80)
PDW BLD-RTO: 14.4 FL (ref 11.5–15)
PLATELET # BLD: 220 E9/L (ref 130–450)
PMV BLD AUTO: 11 FL (ref 7–12)
POTASSIUM SERPL-SCNC: 4.2 MMOL/L (ref 3.5–5)
PREALBUMIN: 15 MG/DL (ref 20–40)
RBC # BLD: 4.66 E12/L (ref 3.5–5.5)
SODIUM BLD-SCNC: 140 MMOL/L (ref 132–146)
T4 FREE: 1.25 NG/DL (ref 0.93–1.7)
TOTAL PROTEIN: 7.5 G/DL (ref 6.4–8.3)
TRIGL SERPL-MCNC: 97 MG/DL (ref 0–149)
TSH SERPL DL<=0.05 MIU/L-ACNC: 2.26 UIU/ML (ref 0.27–4.2)
VLDLC SERPL CALC-MCNC: 19 MG/DL
WBC # BLD: 6.5 E9/L (ref 4.5–11.5)

## 2022-04-27 LAB
HEPATITIS B CORE IGM ANTIBODY: NORMAL
HEPATITIS C ANTIBODY INTERPRETATION: NORMAL
HIV-1 AND HIV-2 ANTIBODIES: NORMAL

## 2022-04-28 LAB — HEPATITIS B CORE TOTAL ANTIBODY: NONREACTIVE

## 2022-06-21 ENCOUNTER — OFFICE VISIT (OUTPATIENT)
Dept: BARIATRICS/WEIGHT MGMT | Age: 37
End: 2022-06-21
Payer: MEDICAID

## 2022-06-21 VITALS
HEART RATE: 78 BPM | BODY MASS INDEX: 47.09 KG/M2 | SYSTOLIC BLOOD PRESSURE: 131 MMHG | TEMPERATURE: 98.2 F | WEIGHT: 293 LBS | HEIGHT: 66 IN | DIASTOLIC BLOOD PRESSURE: 71 MMHG

## 2022-06-21 DIAGNOSIS — G89.29 CHRONIC PAIN OF BOTH KNEES: Primary | ICD-10-CM

## 2022-06-21 DIAGNOSIS — M25.562 CHRONIC PAIN OF BOTH KNEES: Primary | ICD-10-CM

## 2022-06-21 DIAGNOSIS — E66.01 CLASS 3 SEVERE OBESITY DUE TO EXCESS CALORIES WITHOUT SERIOUS COMORBIDITY WITH BODY MASS INDEX (BMI) OF 60.0 TO 69.9 IN ADULT (HCC): ICD-10-CM

## 2022-06-21 DIAGNOSIS — M25.561 CHRONIC PAIN OF BOTH KNEES: Primary | ICD-10-CM

## 2022-06-21 PROBLEM — E66.9 OBESITY: Status: ACTIVE | Noted: 2020-12-21

## 2022-06-21 PROCEDURE — G8417 CALC BMI ABV UP PARAM F/U: HCPCS | Performed by: INTERNAL MEDICINE

## 2022-06-21 PROCEDURE — 1036F TOBACCO NON-USER: CPT | Performed by: INTERNAL MEDICINE

## 2022-06-21 PROCEDURE — 99204 OFFICE O/P NEW MOD 45 MIN: CPT | Performed by: INTERNAL MEDICINE

## 2022-06-21 PROCEDURE — G8428 CUR MEDS NOT DOCUMENT: HCPCS | Performed by: INTERNAL MEDICINE

## 2022-06-21 PROCEDURE — 99202 OFFICE O/P NEW SF 15 MIN: CPT

## 2022-06-21 NOTE — PATIENT INSTRUCTIONS
Rules:  · Count every calorie every day  · Limit chips/crackers/pretzels/nuts/popcorn to 130 becky/day  · Eliminate all sugar sweetened beverages (including fruit juice); limit calories in tea to 40 becky/d  · Limit alcohol to 150 becky/wk  · Limit restaurants (including fast food and food from a convenience store) to one time every two weeks while in town    Requirements:  · Make sure protein intake is at least 65 grams per day (do not count protein every day; instead spot check your intake every 2-3 weeks and make sure what you think you are getting is close to accurate; consider using a protein shake if needed; these are in the pharmacy section of the stores, not the grocery section; Premier, Pure Protein and Fairlife are relatively inexpensive and taste good to most patients; other options are Nectar, Boost Max, Ensure Max, BeneProtein and GNC lean (which is lactose-free); Nectar fruit, Premier Protein Clear, IsoPure Protein Drink, and Protein 2 O are water-based options; Quest (or Cosco, which is cheaper and is ordered on [facility]) and the Oh GlobeIn 1 protein bars can also be used, but have less protein in them )  (Disclaimer: Dietary supplements rarely have their listed ingredients and the amount of each verified by a third party other. Sometimes they give verification for their claims to be GMO and gluten free and to be organic. However, even such verifications as these may still be untrustworthy.)  · Make sure that fiber intake is at least 22 grams per day. Do this by either eating 12 tablespoons of the original, plain Fiber One cereal every day or 4 tablespoons of wheat dextrin powder (Benefiber or a generic brand) every day. Work up to this amount slowly by starting with only one-eighth to one-fourth of the target amount and then adding another one-eighth to one-fourth every one or two weeks until reaching the target.   · Take one multivitamin every day    Targets:  · Limit calorie intake to 1900 calories/day  · Walk 30 minutes daily  · Avoid eating 2 hours within bedtime. Tips:  · Do not eat outside of the dining room or the kitchen  · Do not eat while watching TV, videos, working on the computer or using a smart phone  · Do not eat food out of a multi-serving bag or container. · Establish 6 hours of food-free \"time-out\" periods (times you don't eat) each day. No period can be less than 1 hour long. The periods need to be the same every day for days that are the same (for example, workdays would have one set of food free periods and weekends would have another set of days). These six hours are in addition to the two hours before bedtime and the time spent sleeping.     Consider joining a TOPS (take off pounds sensibly) group    Return to see me in 4-6 weeks

## 2022-06-21 NOTE — PROGRESS NOTES
CC -   Chronic knee pain, Obesity    BACKGROUND -   First visit: 6/21/22     Obesity   Began in childhood  Initial BMI 61.8, Wt 382.8 lbs, Ht 5' 6\"  HS Grad wt 293 lbs  Lowest   wt 293 lbs  Highest  wt 391 lbs  Pattern of wt gain: grad except for 40 lbs over 5 month when she was recovering from [employer] change past yr: none  Most wt lost: 20 lbs [facility])  Other diets attempted: SF, NS    Desire to lose weight: 10/10  Problem posed by appetite: 7/10    Initial Diet:    Number of meals per day - 3    Number of snacks per day - 0-2    Meal volume - 12\" plate, sometimes seconds    Fast food/convenience store - 0-1x/week    Restaurants (not fast food) - 1x/week   Sweets - 0-1d/week   Chips - 7d/week (130 becky serving size)   Crackers/pretzels - 0d/week   Nuts - 0d/week   Peanut Butter - 0d/week   Popcorn - 0d/week   Dried fruit - 0d/week   Whole fruit - 0d/week   Breakfast cereal - 7d/week (1 packet Sikh oats)   Granola/Protein/Energy bar - 0d/week   Sugar sweetened beverages - 2 liter reg soda/wk, 1 cup tea/d with 2 Tbsp Western Ashwini Vanilla, 25 oz Vodka/wk (64 becky/oz)   Protein - No supplements   Fiber - No supplements     Exercise:    Gym membership - none    Walking - none    Running - none    Resistance - none    Aerobic class - none    ______________________    [facility] -  Past Medical History:   Diagnosis Date    ADHD (attention deficit hyperactivity disorder)     Allergic rhinitis     Asthma     Bipolar disorder ([facility])     Chronic pain of both knees     Depression     Irritable bowel syndrome     Obesity     Osteoarthritis     Personality disorder (HCC)     PTSD (post-traumatic stress disorder)     Substance abuse ([facility])      Current Outpatient Medications   Medication Sig Dispense Refill    BUPROPION HCL PO Take 100 mg by mouth daily      hydrOXYzine (ATARAX) 25 MG tablet       budesonide-formoterol (SYMBICORT) 160-4.5 MCG/ACT AERO Inhale 2 puffs into the lungs 2 times daily 3 Inhaler 1    albuterol sulfate HFA (PROVENTIL HFA) 108 (90 Base) MCG/ACT inhaler Inhale 2 puffs into the lungs every 4 hours as needed for Wheezing 1 Inhaler 1    fexofenadine (ALLEGRA) 180 MG tablet Take 1 tablet by mouth daily 90 tablet 1    FLUoxetine (PROZAC) 40 MG capsule Take 40 mg by mouth daily       loperamide (IMODIUM) 2 MG capsule Take 2 mg by mouth as needed for Diarrhea      ARIPiprazole (ABILIFY) 5 MG tablet Take 5 mg by mouth daily       No current facility-administered medications for this visit. ROS -  Card - no CP  GI - + N/V (secondary to her \"stomach being empty\"), + D/C (has irritable bowel)    PE -  Gen : /71 (Site: Left Upper Arm, Position: Sitting, Cuff Size: Large Adult)   Pulse 78   Temp 98.2 °F (36.8 °C) (Temporal)   Ht 5' 6\" (1.676 m)   Wt (!) 382 lb 12.8 oz (173.6 kg)   LMP 06/03/2022 (Approximate)   BMI 61.79 kg/m²    WN, WD, NAD  Heart:  RRR w/o MGR, no Carotid bruits, 4+ LE pitting edema b/l  Lung: Nml resp effort, CTA b/l  Psych: Normal mood   Full affect  Neuro: Moves all ext well  ______________________    HISTORY & ASSESSMENT/PLAN -     Problem 1 - Chronic knee pain   HPI -  Present since late 20's   Severity of pain past week - 7/10   Assessment - Uncontrolled; excess wt is increasing the mechanical load on the joint and altering the biomechanics of gain and posture; reduction may therefore improve her pain   Plan -   Wt loss per the plan below    Problem 2 - Obesity   HPI -  See above Background for description  Weight  Date   382.8 lbs 06/21/22  DEN = 2285 becky/d = 15,995 becky/wk  Wt effect of HR foods = Restaurant food 225 becky/wk + SSB 1220 + Etoh 1600 = 3,045 becky/wk = 435 becky/d = 19% DEN = 43 lbs/yr  She does not want a bariatric surgery at this time and does not want an appetite suppressant  Since she has issues of near syncope when her stomach is empty, will not recommend a VLCD. Instead, will stay with calorie counting (currently using My Fitness Pal).    May benefit from an accountability group since [employer] was helpful in the past. Yeh[employer] Layman will be a more affordable option (finances are limited)  Initial diet: calorie counting with targeted elimination  Initial appetite suppressant: pt deferred   Assessment - Uncontrolled   Plan -   Rules:  · Count every calorie every day  · Limit chips/crackers/pretzels/nuts/popcorn to 130 becky/day  · Eliminate all sugar sweetened beverages (including fruit juice); limit calories in tea to 40 becky/d  · Limit alcohol to 150 becky/wk  · Limit restaurants (including fast food and food from a convenience store) to one time every two weeks while in town    Requirements:  · Make sure protein intake is at least 65 grams per day (do not count protein every day; instead spot check your intake every 2-3 weeks and make sure what you think you are getting is close to accurate; consider using a protein shake if needed; these are in the pharmacy section of the stores, not the grocery section; Premier, Pure Protein and Fairlife are relatively inexpensive and taste good to most patients; other options are Nectar, Boost Max, Ensure Max, BeneProtein and GNC lean (which is lactose-free); Nectar fruit, Premier Protein Clear, IsoPure Protein Drink, and Protein 2 O are water-based options; Quest (or Cosco, which is cheaper and is ordered on [address] [facility] [address]) and the Oh [employer]Comm 1 protein bars can also be used, but have less protein in them )  (Disclaimer: Dietary supplements rarely have their listed ingredients and the amount of each verified by a third party other. Sometimes they give verification for their claims to be GMO and gluten free and to be organic. However, even such verifications as these may still be untrustworthy.)  · Make sure that fiber intake is at least 22 grams per day. Do this by either eating 12 tablespoons of the original, plain Fiber One cereal every day or 4 tablespoons of wheat dextrin powder (Benefiber or a generic brand) every day.  Work up to this amount slowly by starting with only one-eighth to one-fourth of the target amount and then adding another one-eighth to one-fourth every one or two weeks until reaching the target. · Take one multivitamin every day    Targets:  · Limit calorie intake to 1900 calories/day  · Walk 30 minutes daily  · Avoid eating 2 hours within bedtime. Tips:  · Do not eat outside of the dining room or the kitchen  · Do not eat while watching TV, videos, working on the computer or using a smart phone  · Do not eat food out of a multi-serving bag or container. · Establish 6 hours of food-free \"time-out\" periods (times you don't eat) each day. No period can be less than 1 hour long. The periods need to be the same every day for days that are the same (for example, workdays would have one set of food free periods and weekends would have another set of days). These six hours are in addition to the two hours before bedtime and the time spent sleeping.     Consider joining a TOPS (take off pounds sensibly) group    Return to see me in 4-6 weeks    Total time spent on encounter: 45 min    Yoni Thurston MD  Endocrinology/Obesity  6/21/22

## 2022-07-25 ENCOUNTER — OFFICE VISIT (OUTPATIENT)
Dept: FAMILY MEDICINE CLINIC | Age: 37
End: 2022-07-25
Payer: MEDICAID

## 2022-07-25 VITALS
HEART RATE: 83 BPM | WEIGHT: 293 LBS | HEIGHT: 66 IN | DIASTOLIC BLOOD PRESSURE: 68 MMHG | TEMPERATURE: 97.5 F | BODY MASS INDEX: 47.09 KG/M2 | OXYGEN SATURATION: 97 % | RESPIRATION RATE: 16 BRPM | SYSTOLIC BLOOD PRESSURE: 118 MMHG

## 2022-07-25 DIAGNOSIS — Z23 NEED FOR VACCINATION AGAINST STREPTOCOCCUS PNEUMONIAE: ICD-10-CM

## 2022-07-25 DIAGNOSIS — J45.30 MILD PERSISTENT ASTHMA WITHOUT COMPLICATION: ICD-10-CM

## 2022-07-25 PROCEDURE — 1036F TOBACCO NON-USER: CPT | Performed by: NURSE PRACTITIONER

## 2022-07-25 PROCEDURE — 90471 IMMUNIZATION ADMIN: CPT | Performed by: NURSE PRACTITIONER

## 2022-07-25 PROCEDURE — 99213 OFFICE O/P EST LOW 20 MIN: CPT | Performed by: NURSE PRACTITIONER

## 2022-07-25 PROCEDURE — 90732 PPSV23 VACC 2 YRS+ SUBQ/IM: CPT | Performed by: NURSE PRACTITIONER

## 2022-07-25 PROCEDURE — G8427 DOCREV CUR MEDS BY ELIG CLIN: HCPCS | Performed by: NURSE PRACTITIONER

## 2022-07-25 PROCEDURE — G8417 CALC BMI ABV UP PARAM F/U: HCPCS | Performed by: NURSE PRACTITIONER

## 2022-07-25 RX ORDER — BUPROPION HYDROCHLORIDE 100 MG/1
TABLET, EXTENDED RELEASE ORAL
COMMUNITY
Start: 2022-07-19

## 2022-07-25 RX ORDER — ALBUTEROL SULFATE 90 UG/1
2 AEROSOL, METERED RESPIRATORY (INHALATION) EVERY 4 HOURS PRN
Qty: 1 EACH | Refills: 1 | Status: SHIPPED | OUTPATIENT
Start: 2022-07-25 | End: 2023-07-25

## 2022-07-25 RX ORDER — BUDESONIDE AND FORMOTEROL FUMARATE DIHYDRATE 160; 4.5 UG/1; UG/1
2 AEROSOL RESPIRATORY (INHALATION) 2 TIMES DAILY
Qty: 3 EACH | Refills: 1 | Status: SHIPPED | OUTPATIENT
Start: 2022-07-25

## 2022-07-25 ASSESSMENT — ENCOUNTER SYMPTOMS
SHORTNESS OF BREATH: 0
WHEEZING: 0
COUGH: 0

## 2022-07-25 ASSESSMENT — COLUMBIA-SUICIDE SEVERITY RATING SCALE - C-SSRS
6. HAVE YOU EVER DONE ANYTHING, STARTED TO DO ANYTHING, OR PREPARED TO DO ANYTHING TO END YOUR LIFE?: NO
1. WITHIN THE PAST MONTH, HAVE YOU WISHED YOU WERE DEAD OR WISHED YOU COULD GO TO SLEEP AND NOT WAKE UP?: NO
2. HAVE YOU ACTUALLY HAD ANY THOUGHTS OF KILLING YOURSELF?: NO

## 2022-07-25 ASSESSMENT — PATIENT HEALTH QUESTIONNAIRE - PHQ9
SUM OF ALL RESPONSES TO PHQ QUESTIONS 1-9: 2
6. FEELING BAD ABOUT YOURSELF - OR THAT YOU ARE A FAILURE OR HAVE LET YOURSELF OR YOUR FAMILY DOWN: 1
SUM OF ALL RESPONSES TO PHQ QUESTIONS 1-9: 2
10. IF YOU CHECKED OFF ANY PROBLEMS, HOW DIFFICULT HAVE THESE PROBLEMS MADE IT FOR YOU TO DO YOUR WORK, TAKE CARE OF THINGS AT HOME, OR GET ALONG WITH OTHER PEOPLE: 1
9. THOUGHTS THAT YOU WOULD BE BETTER OFF DEAD, OR OF HURTING YOURSELF: 1
5. POOR APPETITE OR OVEREATING: 1
1. LITTLE INTEREST OR PLEASURE IN DOING THINGS: 1
SUM OF ALL RESPONSES TO PHQ QUESTIONS 1-9: 8
7. TROUBLE CONCENTRATING ON THINGS, SUCH AS READING THE NEWSPAPER OR WATCHING TELEVISION: 1
SUM OF ALL RESPONSES TO PHQ QUESTIONS 1-9: 9
SUM OF ALL RESPONSES TO PHQ QUESTIONS 1-9: 9
SUM OF ALL RESPONSES TO PHQ QUESTIONS 1-9: 2
3. TROUBLE FALLING OR STAYING ASLEEP: 1
SUM OF ALL RESPONSES TO PHQ9 QUESTIONS 1 & 2: 2
SUM OF ALL RESPONSES TO PHQ QUESTIONS 1-9: 9
2. FEELING DOWN, DEPRESSED OR HOPELESS: 1
2. FEELING DOWN, DEPRESSED OR HOPELESS: 1
SUM OF ALL RESPONSES TO PHQ QUESTIONS 1-9: 2
SUM OF ALL RESPONSES TO PHQ9 QUESTIONS 1 & 2: 2
4. FEELING TIRED OR HAVING LITTLE ENERGY: 1
8. MOVING OR SPEAKING SO SLOWLY THAT OTHER PEOPLE COULD HAVE NOTICED. OR THE OPPOSITE, BEING SO FIGETY OR RESTLESS THAT YOU HAVE BEEN MOVING AROUND A LOT MORE THAN USUAL: 1
1. LITTLE INTEREST OR PLEASURE IN DOING THINGS: 1

## 2022-07-25 ASSESSMENT — LIFESTYLE VARIABLES
HOW MANY STANDARD DRINKS CONTAINING ALCOHOL DO YOU HAVE ON A TYPICAL DAY: PATIENT DOES NOT DRINK
HOW OFTEN DO YOU HAVE A DRINK CONTAINING ALCOHOL: NEVER

## 2022-07-25 NOTE — ASSESSMENT & PLAN NOTE
Stable at this time she did flare last week with the heat and humidity. Discussed vacuuming every day in the bedroom and twice a week in the house, dust daily and wash the bedding once a week, try to keep the dog out of the bedroom and bed. If increasing flares would recommend starting Montelukast but today she doesn't feel she needs it. Also work on weight loss and she is following with DR Blanche Panchal.

## 2022-07-25 NOTE — PROGRESS NOTES
OFFICE PROGRESS NOTE  [address]  [address] [address]  Dept: [phone]   Chief Complaint   Patient presents with    Health Maintenance     Due for tdap and pneu,     Asthma       ASSESSMENT/PLAN   1. Mild persistent asthma without complication  Assessment & Plan:   Stable at this time she did flare last week with the heat and humidity. Discussed vacuuming every day in the bedroom and twice a week in the house, dust daily and wash the bedding once a week, try to keep the dog out of the bedroom and bed. If increasing flares would recommend starting Montelukast but today she doesn't feel she needs it. Also work on weight loss and she is following with DR Armin Smith. Orders:  -     budesonide-formoterol (SYMBICORT) 160-4.5 MCG/ACT AERO; Inhale 2 puffs into the lungs in the morning and 2 puffs before bedtime. , Disp-3 each, R-1Normal  -     albuterol sulfate HFA (PROVENTIL HFA) 108 (90 Base) MCG/ACT inhaler; Inhale 2 puffs into the lungs every 4 hours as needed for Wheezing, Disp-1 each, R-1Normal  2. Need for vaccination against Streptococcus pneumoniae  Assessment & Plan:   About half of people who get PPSV have mild side effects, such as redness or pain where the shot is given, which go away within about two days. Less than 1 out of 100 people develop a fever, muscle aches, or more severe local reactions. Discussed reducing caffeine intake, Discussed smoking cessation, Discussed weight loss, Discussed exercising 30 minutes daily, Discussed limit alcohol to one drink per day, and Discussed taking medications as directed and adverse effects    Return in about 6 months (around 1/25/2023) for asthma. HPI:   Here today for follow up on asthma, last week was really bad with the increased heat and humidity. She does take her Symbicort every day. Today she has no symptoms, she does have air conditioning at home.  She does have a pet and also carpeting in the house. She takes her Allegra every day. She has been using her albuterol when the days are bad maybe 3 times a day. When it is good only as needed which is rare. She did see Dr Nikki Cowan and was doing well then states she lost focus. Current Outpatient Medications:     budesonide-formoterol (SYMBICORT) 160-4.5 MCG/ACT AERO, Inhale 2 puffs into the lungs in the morning and 2 puffs before bedtime. , Disp: 3 each, Rfl: 1    albuterol sulfate HFA (PROVENTIL HFA) 108 (90 Base) MCG/ACT inhaler, Inhale 2 puffs into the lungs every 4 hours as needed for Wheezing, Disp: 1 each, Rfl: 1    hydrOXYzine (ATARAX) 25 MG tablet, , Disp: , Rfl:     fexofenadine (ALLEGRA) 180 MG tablet, Take 1 tablet by mouth daily, Disp: 90 tablet, Rfl: 1    FLUoxetine (PROZAC) 40 MG capsule, Take 40 mg by mouth daily , Disp: , Rfl:     loperamide (IMODIUM) 2 MG capsule, Take 2 mg by mouth as needed for Diarrhea, Disp: , Rfl:     ARIPiprazole (ABILIFY) 5 MG tablet, Take 2.5 mg by mouth in the morning., Disp: , Rfl:     buPROPion (WELLBUTRIN SR) 100 MG extended release tablet, , Disp: , Rfl:       Surgical History:  has no past surgical history on file. Social History:  reports that she has quit smoking. Her smoking use included cigarettes. She has never used smokeless tobacco. She reports current alcohol use. She reports that she does not currently use drugs after having used the following drugs: Marijuana Brian Daniels). Family History: family history includes Arthritis in her mother and paternal grandmother; Asthma in her mother; Cancer in her maternal grandmother; Diabetes in her brother, father, and mother; Early Death in her maternal uncle; High Blood Pressure in her mother; High Cholesterol in her father and mother; Stroke in her paternal grandmother.   I have reviewed Juyl's allergies, medications, problem list, medical, social and family history and have updated as needed in the electronic medical record    Review of Systems   Constitutional:  Negative for activity change, appetite change, chills, diaphoresis, fatigue, fever and unexpected weight change. Respiratory:  Negative for cough, shortness of breath and wheezing. Cardiovascular:  Negative for chest pain, palpitations and leg swelling. Allergic/Immunologic: Positive for environmental allergies. OBJECTIVE:     VS:  Wt Readings from Last 3 Encounters:   07/25/22 (!) 387 lb (175.5 kg)   06/21/22 (!) 382 lb 12.8 oz (173.6 kg)   04/25/22 (!) 391 lb 1.6 oz (177.4 kg)                       Vitals:    07/25/22 0926   BP: 118/68   Pulse: 83   Resp: 16   Temp: 97.5 °F (36.4 °C)   SpO2: 97%   Weight: (!) 387 lb (175.5 kg)   Height: 5' 6\" (1.676 m)       General: Alert and oriented to person, place, and time, well developed and well nourished, morbidly obese, in no acute distress  SKIN: Warm and dry, intact without any rash, masses or lesions  HEAD: normocephalic, atraumatic  Neck: supple and non-tender without mass, trachea midline, no cervical lymphadenopathy, no bruit, no thyromegaly or nodules  Cardiovascular: regular rate and regular rhythm, normal S1 and S2,  no murmurs, rubs, clicks, or gallop. Distal pulses intact, no carotid bruits. No edema  Pulmonary/Chest: clear to auscultation bilaterally, no wheezes, rales or rhonchi, normal air movement, no respiratory distress  Abdomen: soft, non-tender, non-distended, normal bowel sounds, no masses or hepatosplenomegaly  Neurologic:  gait, coordination and speech normal  Extremities: no clubbing, cyanosis, or edema. Psychiatric: Good eye contact, normal mood and affect, answers questions appropriately    I have reviewed my findings and recommendations with Reema Blake.     Adam Ruiz, GEORGE - CNP, NP-C, FNP-BC

## 2022-11-27 ENCOUNTER — HOSPITAL ENCOUNTER (OUTPATIENT)
Dept: ULTRASOUND IMAGING | Age: 37
Discharge: HOME | End: 2022-11-27
Payer: MEDICAID

## 2022-11-27 DIAGNOSIS — R11.2 NAUSEA AND VOMITING, UNSPECIFIED VOMITING TYPE: ICD-10-CM

## 2022-11-27 DIAGNOSIS — R19.7 DIARRHEA, UNSPECIFIED TYPE: ICD-10-CM

## 2022-11-27 DIAGNOSIS — R10.84 ABDOMINAL PAIN, GENERALIZED: ICD-10-CM

## 2022-11-27 PROCEDURE — 76705 ECHO EXAM OF ABDOMEN: CPT

## 2023-01-23 ENCOUNTER — OFFICE VISIT (OUTPATIENT)
Dept: FAMILY MEDICINE CLINIC | Age: 38
End: 2023-01-23
Payer: MEDICAID

## 2023-01-23 VITALS
DIASTOLIC BLOOD PRESSURE: 76 MMHG | OXYGEN SATURATION: 98 % | TEMPERATURE: 97.6 F | BODY MASS INDEX: 47.09 KG/M2 | WEIGHT: 293 LBS | HEIGHT: 66 IN | SYSTOLIC BLOOD PRESSURE: 122 MMHG | HEART RATE: 85 BPM | RESPIRATION RATE: 18 BRPM

## 2023-01-23 DIAGNOSIS — Z23 NEEDS FLU SHOT: ICD-10-CM

## 2023-01-23 DIAGNOSIS — F31.9 BIPOLAR DEPRESSION (HCC): ICD-10-CM

## 2023-01-23 DIAGNOSIS — Z91.09 ENVIRONMENTAL ALLERGIES: ICD-10-CM

## 2023-01-23 DIAGNOSIS — F60.3 BORDERLINE PERSONALITY DISORDER (HCC): ICD-10-CM

## 2023-01-23 DIAGNOSIS — J45.30 MILD PERSISTENT ASTHMA WITHOUT COMPLICATION: Primary | ICD-10-CM

## 2023-01-23 DIAGNOSIS — Z13.220 SCREENING, LIPID: ICD-10-CM

## 2023-01-23 DIAGNOSIS — Z83.3 FH: DIABETES MELLITUS: ICD-10-CM

## 2023-01-23 DIAGNOSIS — Z23 NEED FOR DIPHTHERIA-TETANUS-PERTUSSIS (TDAP) VACCINE: ICD-10-CM

## 2023-01-23 PROCEDURE — G8427 DOCREV CUR MEDS BY ELIG CLIN: HCPCS | Performed by: NURSE PRACTITIONER

## 2023-01-23 PROCEDURE — 99214 OFFICE O/P EST MOD 30 MIN: CPT | Performed by: NURSE PRACTITIONER

## 2023-01-23 PROCEDURE — 1036F TOBACCO NON-USER: CPT | Performed by: NURSE PRACTITIONER

## 2023-01-23 PROCEDURE — G8484 FLU IMMUNIZE NO ADMIN: HCPCS | Performed by: NURSE PRACTITIONER

## 2023-01-23 PROCEDURE — 90694 VACC AIIV4 NO PRSRV 0.5ML IM: CPT | Performed by: NURSE PRACTITIONER

## 2023-01-23 PROCEDURE — G8417 CALC BMI ABV UP PARAM F/U: HCPCS | Performed by: NURSE PRACTITIONER

## 2023-01-23 PROCEDURE — 90471 IMMUNIZATION ADMIN: CPT | Performed by: NURSE PRACTITIONER

## 2023-01-23 RX ORDER — FLUTICASONE PROPIONATE 50 MCG
2 SPRAY, SUSPENSION (ML) NASAL DAILY
Qty: 48 G | Refills: 1 | Status: SHIPPED | OUTPATIENT
Start: 2023-01-23

## 2023-01-23 RX ORDER — FAMOTIDINE 40 MG/1
40 TABLET, FILM COATED ORAL DAILY
COMMUNITY
Start: 2023-01-11

## 2023-01-23 RX ORDER — RIFAXIMIN 550 MG/1
550 TABLET ORAL 3 TIMES DAILY
COMMUNITY
Start: 2023-01-10

## 2023-01-23 RX ORDER — BUDESONIDE AND FORMOTEROL FUMARATE DIHYDRATE 160; 4.5 UG/1; UG/1
2 AEROSOL RESPIRATORY (INHALATION) 2 TIMES DAILY
Qty: 3 EACH | Refills: 1 | Status: SHIPPED | OUTPATIENT
Start: 2023-01-23

## 2023-01-23 ASSESSMENT — PATIENT HEALTH QUESTIONNAIRE - PHQ9
SUM OF ALL RESPONSES TO PHQ QUESTIONS 1-9: 15
9. THOUGHTS THAT YOU WOULD BE BETTER OFF DEAD, OR OF HURTING YOURSELF: 0
7. TROUBLE CONCENTRATING ON THINGS, SUCH AS READING THE NEWSPAPER OR WATCHING TELEVISION: 2
SUM OF ALL RESPONSES TO PHQ9 QUESTIONS 1 & 2: 2
1. LITTLE INTEREST OR PLEASURE IN DOING THINGS: 1
3. TROUBLE FALLING OR STAYING ASLEEP: 3
SUM OF ALL RESPONSES TO PHQ QUESTIONS 1-9: 15
4. FEELING TIRED OR HAVING LITTLE ENERGY: 3
5. POOR APPETITE OR OVEREATING: 3
6. FEELING BAD ABOUT YOURSELF - OR THAT YOU ARE A FAILURE OR HAVE LET YOURSELF OR YOUR FAMILY DOWN: 2
8. MOVING OR SPEAKING SO SLOWLY THAT OTHER PEOPLE COULD HAVE NOTICED. OR THE OPPOSITE, BEING SO FIGETY OR RESTLESS THAT YOU HAVE BEEN MOVING AROUND A LOT MORE THAN USUAL: 0
2. FEELING DOWN, DEPRESSED OR HOPELESS: 1
10. IF YOU CHECKED OFF ANY PROBLEMS, HOW DIFFICULT HAVE THESE PROBLEMS MADE IT FOR YOU TO DO YOUR WORK, TAKE CARE OF THINGS AT HOME, OR GET ALONG WITH OTHER PEOPLE: 3

## 2023-01-23 ASSESSMENT — ENCOUNTER SYMPTOMS
VOMITING: 0
SORE THROAT: 0
WHEEZING: 0
FACIAL SWELLING: 0
COLOR CHANGE: 0
SINUS PRESSURE: 1
CONSTIPATION: 0
SINUS PAIN: 0
COUGH: 0
DIARRHEA: 1
ABDOMINAL PAIN: 0
NAUSEA: 0
RHINORRHEA: 0
SHORTNESS OF BREATH: 0
TROUBLE SWALLOWING: 0
VOICE CHANGE: 0

## 2023-01-23 NOTE — ASSESSMENT & PLAN NOTE
Uncontrolled with severe weather changes, Continue Allegra daily, start Flonase 2 sprays to each nostril twice daily after SWI.

## 2023-01-23 NOTE — ASSESSMENT & PLAN NOTE
Flu vaccine given  · Wash your hands regularly, and keep your hands away from your face. · Cover your mouth when you cough or sneeze. · Rest, plenty of fluids, Tylenol for body aches, fever. · Stay home from school, work, and other public places until you are feeling better and your fever has been gone for at least 24 hours. The fever needs to have gone away on its own without the help of medicine. · Ask people living with you to talk to their doctors about preventing the flu. They may get antiviral medicine to keep from getting the flu from you. · To prevent the flu in the future, get a flu vaccine every fall. Encourage people living with you to get the vaccine.     ·

## 2023-01-23 NOTE — ASSESSMENT & PLAN NOTE
Stable continue at this time, continue Symbicort 160-4.5 mcg 2 puffs BID, rinse mouth well after the Symbicort. Vacuum daily. Avoid triggers as much as possible.

## 2023-01-23 NOTE — ASSESSMENT & PLAN NOTE
stable follows with psychiatry for her borderline personality disorder and depression. She is on hydroxyzine 25 mg BID as needed but only uses it 1 - 2 times a week, she is on Prozac 40 mg daily, Abilify increased to  5 mg daily and Bupropion  mg daily. She sees psychiatrist last month and has next appointment in March and she follows with therapist weekly, had appointment last week and upcoming on Thursday. Denies any adverse effects from her medications. On the basis of positive PHQ-9 screening (PHQ-9 Total Score: 15), the following plan was implemented: additional evaluation and assessment performed, follow-up plan includes but not limited to: Medication management and Referral to /Specialist for evaluation and management, C-SSRS performed and sees psychiatry.   Patient will follow-up in 1 week(s) with psychotherapist.

## 2023-01-23 NOTE — ASSESSMENT & PLAN NOTE
Common side effects of Td vaccination include soreness in the arm where you got the shot and a mild fever. These usually occur within 3 days of the shot and last a short time  Get in 2 weeks.

## 2023-01-23 NOTE — PROGRESS NOTES
OFFICE PROGRESS NOTE  [address]  [address] [address]  Dept: [phone]   Chief Complaint   Patient presents with    Asthma    Health Maintenance     Due for Covid, Varicella(had as a child), Tdap, Flu       ASSESSMENT/PLAN   1. Mild persistent asthma without complication  Assessment & Plan:   Stable continue at this time, continue Symbicort 160-4.5 mcg 2 puffs BID, rinse mouth well after the Symbicort. Vacuum daily. Avoid triggers as much as possible. Orders:  -     budesonide-formoterol (SYMBICORT) 160-4.5 MCG/ACT AERO; Inhale 2 puffs into the lungs 2 times daily, Disp-3 each, R-1Normal  -     fluticasone (FLONASE) 50 MCG/ACT nasal spray; 2 sprays by Each Nostril route daily, Disp-48 g, R-1Normal  -     CBC with Auto Differential; Future  -     Comprehensive Metabolic Panel; Future  2. Environmental allergies  Assessment & Plan:   Uncontrolled with severe weather changes, Continue Allegra daily, start Flonase 2 sprays to each nostril twice daily after SWI. Orders:  -     fluticasone (FLONASE) 50 MCG/ACT nasal spray; 2 sprays by Each Nostril route daily, Disp-48 g, R-1Normal  3. Borderline personality disorder [facility])  Assessment & Plan:  stable follows with psychiatry for her borderline personality disorder and depression. She is on hydroxyzine 25 mg BID as needed but only uses it 1 - 2 times a week, she is on Prozac 40 mg daily, Abilify increased to  5 mg daily and Bupropion  mg daily. She sees psychiatrist last month and has next appointment in March and she follows with therapist weekly, had appointment last week and upcoming on Thursday. Denies any adverse effects from her medications. 4. Bipolar depression ([facility] [address]  Assessment & Plan:   stable follows with psychiatry for her borderline personality disorder and depression.  She is on hydroxyzine 25 mg BID as needed but only uses it 1 - 2 times a week, she is on Prozac 40 mg daily, Abilify increased to  5 mg daily and Bupropion  mg daily. She sees psychiatrist last month and has next appointment in March and she follows with therapist weekly, had appointment last week and upcoming on Thursday. Denies any adverse effects from her medications. On the basis of positive PHQ-9 screening (PHQ-9 Total Score: 15), the following plan was implemented: additional evaluation and assessment performed, follow-up plan includes but not limited to: Medication management and Referral to /Specialist for evaluation and management, C-SSRS performed and sees psychiatry. Patient will follow-up in 1 week(s) with psychotherapist.  5. Needs flu shot  Assessment & Plan:   Flu vaccine given  Wash your hands regularly, and keep your hands away from your face. Cover your mouth when you cough or sneeze. Rest, plenty of fluids, Tylenol for body aches, fever. Stay home from school, work, and other public places until you are feeling better and your fever has been gone for at least 24 hours. The fever needs to have gone away on its own without the help of medicine. Ask people living with you to talk to their doctors about preventing the flu. They may get antiviral medicine to keep from getting the flu from you. To prevent the flu in the future, get a flu vaccine every fall. Encourage people living with you to get the vaccine. Orders:  -     Influenza, FLUAD, (age 72 y+), IM, Preservative Free, 0.5 mL  6. Need for diphtheria-tetanus-pertussis (Tdap) vaccine  Assessment & Plan:   Common side effects of Td vaccination include soreness in the arm where you got the shot and a mild fever. These usually occur within 3 days of the shot and last a short time  Get in 2 weeks. Orders:  -     Tetanus-Diphth-Acell Pertussis ([address]) 5-2.5-18.5 LF-MCG/0.5 injection; Inject 0.5 mLs into the muscle once for 1 dose, Disp-0.5 mL, R-0Print  7. FH: diabetes mellitus  -     Hemoglobin A1C; Future  8.  Screening, lipid  - Lipid Panel; Future       Reviewed labs: CMP, CBCD, Lipids, A1c in Epic 4/2022    Discussed weight loss, Discussed exercising 30 minutes daily, Discussed limit alcohol to one drink per day, and Discussed taking medications as directed and adverse effects    Return for asthma, allergies. HPI:   Here today for follow up on asthma has been well controlled on the Symbicort inhaler she has only been using it once a day explained it should be twice a day. She does complain of her sinus acting up with the weather changes more than half the days with the weather states spring and fall is the worse times for her. She had Endoscopy with Dr Michelle Arevalo and was told she had a lot of drainage in her stomach. She does have a pet at home and carpeting but only vacuums every other day. She has tried Flonase in the past which has helped. She follows with psychiatry for her borderline personality disorder and depression. She is on hydroxyzine 25 mg BID as needed but only uses it 1 - 2 times a week, she is on Prozac 40 mg daily, Abilify increased to  5 mg daily and Bupropion  mg daily. She sees psychiatrist last month and has next appointment in March and she follows with therapist weekly, had appointment last week and upcoming on Thursday. Denies any adverse effects from her medications.          Current Outpatient Medications:     famotidine (PEPCID) 40 MG tablet, Take 40 mg by mouth daily Dr. Michelle Arevalo ordered, Disp: , Rfl:     budesonide-formoterol (SYMBICORT) 160-4.5 MCG/ACT AERO, Inhale 2 puffs into the lungs 2 times daily, Disp: 3 each, Rfl: 1    Tetanus-Diphth-Acell Pertussis (BOOSTRIX) 5-2.5-18.5 LF-MCG/0.5 injection, Inject 0.5 mLs into the muscle once for 1 dose, Disp: 0.5 mL, Rfl: 0    fluticasone (FLONASE) 50 MCG/ACT nasal spray, 2 sprays by Each Nostril route daily, Disp: 48 g, Rfl: 1    buPROPion (WELLBUTRIN SR) 100 MG extended release tablet, daily, Disp: , Rfl:     albuterol sulfate HFA (PROVENTIL HFA) 108 (90 Base) MCG/ACT inhaler, Inhale 2 puffs into the lungs every 4 hours as needed for Wheezing, Disp: 1 each, Rfl: 1    hydrOXYzine (ATARAX) 25 MG tablet, Take 25 mg by mouth 2 times daily as needed, Disp: , Rfl:     fexofenadine (ALLEGRA) 180 MG tablet, Take 1 tablet by mouth daily, Disp: 90 tablet, Rfl: 1    FLUoxetine (PROZAC) 40 MG capsule, Take 40 mg by mouth daily , Disp: , Rfl:     loperamide (IMODIUM) 2 MG capsule, Take 2 mg by mouth as needed for Diarrhea, Disp: , Rfl:     ARIPiprazole (ABILIFY) 5 MG tablet, Take 5 mg by mouth daily RESTARTED 5MG, Disp: , Rfl:     XIFAXAN 550 MG tablet, Take 550 mg by mouth in the morning, at noon, and at bedtime FOR 14 DAYS, Disp: , Rfl:       Surgical History:  has no past surgical history on file. Social History:  reports that she has quit smoking. Her smoking use included cigarettes. She has never used smokeless tobacco. She reports current alcohol use. She reports that she does not currently use drugs after having used the following drugs: Marijuana Brandi Robert). Family History: family history includes Arthritis in her mother and paternal grandmother; Asthma in her mother; Cancer in her maternal grandmother; Diabetes in her brother, father, and mother; Early Death in her maternal uncle; High Blood Pressure in her mother; High Cholesterol in her father and mother; Stroke in her paternal grandmother. I have reviewed July's allergies, medications, problem list, medical, social and family history and have updated as needed in the electronic medical record    Review of Systems   Constitutional:  Negative for activity change, appetite change, chills, diaphoresis, fatigue, fever and unexpected weight change. HENT:  Positive for congestion (only when severe allergies), postnasal drip, sinus pressure and sneezing.  Negative for dental problem, drooling, ear discharge, ear pain, facial swelling, hearing loss, mouth sores, nosebleeds, rhinorrhea, sinus pain, sore throat, tinnitus, trouble swallowing and voice change. Respiratory:  Negative for cough, shortness of breath and wheezing. Cardiovascular:  Negative for chest pain, palpitations and leg swelling. Gastrointestinal:  Positive for diarrhea (IBS). Negative for abdominal pain, constipation, nausea and vomiting. Skin:  Negative for color change, pallor, rash and wound. Allergic/Immunologic: Positive for environmental allergies. Psychiatric/Behavioral:  Positive for agitation, decreased concentration and dysphoric mood. Negative for behavioral problems, confusion, hallucinations, self-injury, sleep disturbance and suicidal ideas. The patient is nervous/anxious. The patient is not hyperactive. OBJECTIVE:     VS:  Wt Readings from Last 3 Encounters:   01/23/23 (!) 378 lb (171.5 kg)   07/25/22 (!) 387 lb (175.5 kg)   06/21/22 (!) 382 lb 12.8 oz (173.6 kg)                       Vitals:    01/23/23 1029   BP: 122/76   Pulse: 85   Resp: 18   Temp: 97.6 °F (36.4 °C)   SpO2: 98%   Weight: (!) 378 lb (171.5 kg)   Height: 5' 6\" (1.676 m)       General: Alert and oriented to person, place, and time, well developed and well nourished, morbidly obese has lost 10 pounds,  in no acute distress  SKIN: Warm and dry, intact without any rash, masses or lesions  HEAD: normocephalic, atraumatic  ENT: tympanic membranes, external ear and ear canal normal bilaterally, normal hearing, Nose without deformity, nasal mucosa and turbinates normal without polyps   Throat: clear, tongue midline, tonsils 1+, drainage, no masses or lesions noted, good dentition  Neck: supple and non-tender without mass, trachea midline, no cervical lymphadenopathy, no bruit, no thyromegaly or nodules  Cardiovascular: regular rate and regular rhythm, normal S1 and S2,  no murmurs, rubs, clicks, or gallop. Distal pulses intact, no carotid bruits.  No edema  Pulmonary/Chest: clear to auscultation bilaterally, no wheezes, rales or rhonchi, normal air movement, no respiratory distress  Abdomen: soft, non-tender, non-distended, normal bowel sounds, no masses or hepatosplenomegaly  Neurologic:  gait, coordination and speech normal  Extremities: no clubbing, cyanosis, or edema. Psychiatric: Good eye contact, normal mood and affect, answers questions appropriately    I have reviewed my findings and recommendations with Onel Chopra.     Darrius Lee, GEORGE - CNP, NP-C, FNP-BC

## 2023-01-23 NOTE — ASSESSMENT & PLAN NOTE
stable follows with psychiatry for her borderline personality disorder and depression. She is on hydroxyzine 25 mg BID as needed but only uses it 1 - 2 times a week, she is on Prozac 40 mg daily, Abilify increased to  5 mg daily and Bupropion  mg daily. She sees psychiatrist last month and has next appointment in March and she follows with therapist weekly, had appointment last week and upcoming on Thursday. Denies any adverse effects from her medications.

## 2023-06-12 PROBLEM — F43.10 POST TRAUMATIC STRESS DISORDER (PTSD): Status: ACTIVE | Noted: 2019-10-02

## 2023-06-12 PROBLEM — R11.2 NAUSEA AND VOMITING IN ADULT: Status: ACTIVE | Noted: 2023-06-12

## 2023-06-12 PROBLEM — F41.9 ANXIETY: Status: ACTIVE | Noted: 2019-10-02

## 2023-08-02 LAB
ALBUMIN SERPL-MCNC: 4 G/DL (ref 3.5–5.2)
ALP SERPL-CCNC: 91 U/L (ref 35–104)
ALT SERPL-CCNC: 10 U/L (ref 0–32)
ANION GAP SERPL CALCULATED.3IONS-SCNC: 9 MMOL/L (ref 7–16)
AST SERPL-CCNC: 14 U/L (ref 0–31)
BASOPHILS # BLD: 0.05 K/UL (ref 0–0.2)
BASOPHILS NFR BLD: 1 % (ref 0–2)
BILIRUB SERPL-MCNC: 0.4 MG/DL (ref 0–1.2)
BUN SERPL-MCNC: 14 MG/DL (ref 6–20)
CALCIUM SERPL-MCNC: 9 MG/DL (ref 8.6–10.2)
CHLORIDE SERPL-SCNC: 106 MMOL/L (ref 98–107)
CHOLEST SERPL-MCNC: 164 MG/DL
CO2 SERPL-SCNC: 24 MMOL/L (ref 22–29)
CREAT SERPL-MCNC: 0.8 MG/DL (ref 0.5–1)
EOSINOPHIL # BLD: 0.1 K/UL (ref 0.05–0.5)
EOSINOPHILS RELATIVE PERCENT: 2 % (ref 0–6)
ERYTHROCYTE [DISTWIDTH] IN BLOOD BY AUTOMATED COUNT: 15.9 % (ref 11.5–15)
GFR SERPL CREATININE-BSD FRML MDRD: >60 ML/MIN/1.73M2
GLUCOSE SERPL-MCNC: 79 MG/DL (ref 74–99)
HBA1C MFR BLD: 5 % (ref 4–5.6)
HCT VFR BLD AUTO: 37.6 % (ref 34–48)
HDLC SERPL-MCNC: 50 MG/DL
HGB BLD-MCNC: 10.4 G/DL (ref 11.5–15.5)
IMM GRANULOCYTES # BLD AUTO: <0.03 K/UL (ref 0–0.58)
IMM GRANULOCYTES NFR BLD: 0 % (ref 0–5)
LDLC SERPL CALC-MCNC: 104 MG/DL
LYMPHOCYTES NFR BLD: 1.53 K/UL (ref 1.5–4)
LYMPHOCYTES RELATIVE PERCENT: 25 % (ref 20–42)
MCH RBC QN AUTO: 24 PG (ref 26–35)
MCHC RBC AUTO-ENTMCNC: 27.7 G/DL (ref 32–34.5)
MCV RBC AUTO: 86.8 FL (ref 80–99.9)
MONOCYTES NFR BLD: 0.3 K/UL (ref 0.1–0.95)
MONOCYTES NFR BLD: 5 % (ref 2–12)
NEUTROPHILS NFR BLD: 68 % (ref 43–80)
NEUTS SEG NFR BLD: 4.19 K/UL (ref 1.8–7.3)
PLATELET # BLD AUTO: 251 K/UL (ref 130–450)
PMV BLD AUTO: 11.3 FL (ref 7–12)
POTASSIUM SERPL-SCNC: 4.3 MMOL/L (ref 3.5–5)
PROT SERPL-MCNC: 6.9 G/DL (ref 6.4–8.3)
RBC # BLD AUTO: 4.33 M/UL (ref 3.5–5.5)
RBC # BLD: ABNORMAL 10*6/UL
SODIUM SERPL-SCNC: 139 MMOL/L (ref 132–146)
TRIGL SERPL-MCNC: 51 MG/DL
VLDLC SERPL CALC-MCNC: 10 MG/DL
WBC OTHER # BLD: 6.2 K/UL (ref 4.5–11.5)

## 2023-12-11 ENCOUNTER — OFFICE VISIT (OUTPATIENT)
Dept: FAMILY MEDICINE CLINIC | Age: 38
End: 2023-12-11
Payer: MEDICARE

## 2023-12-11 VITALS
TEMPERATURE: 97.5 F | OXYGEN SATURATION: 98 % | BODY MASS INDEX: 47.09 KG/M2 | WEIGHT: 293 LBS | SYSTOLIC BLOOD PRESSURE: 126 MMHG | DIASTOLIC BLOOD PRESSURE: 72 MMHG | HEART RATE: 70 BPM | HEIGHT: 66 IN | RESPIRATION RATE: 16 BRPM

## 2023-12-11 DIAGNOSIS — K29.30 CHRONIC SUPERFICIAL GASTRITIS WITHOUT BLEEDING: ICD-10-CM

## 2023-12-11 DIAGNOSIS — J45.30 MILD PERSISTENT ASTHMA WITHOUT COMPLICATION: Primary | ICD-10-CM

## 2023-12-11 DIAGNOSIS — G47.30 SLEEP-DISORDERED BREATHING: ICD-10-CM

## 2023-12-11 LAB
FERRITIN: 44 NG/ML
IRON % SATURATION: 11 % (ref 15–50)
IRON: 33 UG/DL (ref 37–145)
TOTAL IRON BINDING CAPACITY: 304 UG/DL (ref 250–450)

## 2023-12-11 PROCEDURE — 90694 VACC AIIV4 NO PRSRV 0.5ML IM: CPT | Performed by: NURSE PRACTITIONER

## 2023-12-11 PROCEDURE — 99214 OFFICE O/P EST MOD 30 MIN: CPT | Performed by: NURSE PRACTITIONER

## 2023-12-11 PROCEDURE — G0008 ADMIN INFLUENZA VIRUS VAC: HCPCS | Performed by: NURSE PRACTITIONER

## 2023-12-11 RX ORDER — BUDESONIDE AND FORMOTEROL FUMARATE DIHYDRATE 160; 4.5 UG/1; UG/1
2 AEROSOL RESPIRATORY (INHALATION) 2 TIMES DAILY
Qty: 3 EACH | Refills: 1 | Status: SHIPPED | OUTPATIENT
Start: 2023-12-11

## 2023-12-11 RX ORDER — ALBUTEROL SULFATE 90 UG/1
2 AEROSOL, METERED RESPIRATORY (INHALATION) EVERY 4 HOURS PRN
Qty: 1 EACH | Refills: 1 | Status: SHIPPED | OUTPATIENT
Start: 2023-12-11 | End: 2024-12-10

## 2023-12-11 ASSESSMENT — ENCOUNTER SYMPTOMS
SORE THROAT: 0
TROUBLE SWALLOWING: 0
ABDOMINAL PAIN: 0
RHINORRHEA: 0
COLOR CHANGE: 0
VOICE CHANGE: 0
CHEST TIGHTNESS: 0
VOMITING: 0
FACIAL SWELLING: 0
CONSTIPATION: 0
SINUS PAIN: 0
DIARRHEA: 0
BACK PAIN: 0
NAUSEA: 0
COUGH: 1
SINUS PRESSURE: 0
WHEEZING: 1
SHORTNESS OF BREATH: 0

## 2023-12-11 NOTE — ASSESSMENT & PLAN NOTE
Well controlled except at night, after further discussion with patient and SO may be her reflux causing problems will try to get this under control  resume the pepcid 40 mg daily and try to sleep on side or back not on stomach. May consider starting montelukast, vacuum daily, get in more exercise.

## 2023-12-11 NOTE — ASSESSMENT & PLAN NOTE
Uncontrolled in review of EGD biopsy indicates chronic gastritis, check celiac panel and resume the pepcid 40 mg daily, chronic anemia check iron levels and celiac panel.  IF negative for celiac will trial ferrous sulfate and repeat labs if not improving then will send to hematology for further evaluation

## 2023-12-11 NOTE — PROGRESS NOTES
normal bowel sounds, no masses or hepatosplenomegaly  Musculoskeletal: Normal ROM, no joint swelling, deformity or tenderness   Neurologic:  gait, coordination and speech normal  Extremities: no clubbing, cyanosis, or edema. Psychiatric: Good eye contact, normal mood and affect, answers questions appropriately    I have reviewed my findings and recommendations with Josefa Smith.     Claus Hernandez, APRN - CNP, NP-C, FNP-BC

## 2023-12-12 DIAGNOSIS — D50.8 IRON DEFICIENCY ANEMIA SECONDARY TO INADEQUATE DIETARY IRON INTAKE: Primary | ICD-10-CM

## 2023-12-12 RX ORDER — LANOLIN ALCOHOL/MO/W.PET/CERES
325 CREAM (GRAM) TOPICAL
Qty: 90 TABLET | Refills: 3 | Status: SHIPPED | OUTPATIENT
Start: 2023-12-12

## 2023-12-16 LAB
GLIADIN DEAMINIDATED PEPTIDE AB IGA: 0.8 U/ML
GLIADIN DEAMINIDATED PEPTIDE AB IGG: 0.6 U/ML
IGA: 154 MG/DL (ref 70–400)
TISSUE TRANSGLUTAMINASE IGA: 0.3 U/ML

## 2024-01-11 ENCOUNTER — HOSPITAL ENCOUNTER (OUTPATIENT)
Dept: SLEEP CENTER | Age: 39
Discharge: HOME | End: 2024-01-11
Payer: MEDICARE

## 2024-01-11 DIAGNOSIS — G47.30 SLEEP-DISORDERED BREATHING: ICD-10-CM

## 2024-01-11 PROCEDURE — 95810 POLYSOM 6/> YRS 4/> PARAM: CPT

## 2024-02-06 ENCOUNTER — OFFICE VISIT (OUTPATIENT)
Dept: SLEEP CENTER | Age: 39
End: 2024-02-06
Payer: COMMERCIAL

## 2024-02-06 VITALS
SYSTOLIC BLOOD PRESSURE: 116 MMHG | BODY MASS INDEX: 47.09 KG/M2 | HEART RATE: 86 BPM | HEIGHT: 66 IN | WEIGHT: 293 LBS | DIASTOLIC BLOOD PRESSURE: 76 MMHG | RESPIRATION RATE: 17 BRPM | OXYGEN SATURATION: 97 %

## 2024-02-06 DIAGNOSIS — G47.10 HYPERSOMNOLENCE: ICD-10-CM

## 2024-02-06 DIAGNOSIS — G47.34 NOCTURNAL HYPOXEMIA: ICD-10-CM

## 2024-02-06 DIAGNOSIS — G47.33 OSA (OBSTRUCTIVE SLEEP APNEA): Primary | ICD-10-CM

## 2024-02-06 PROCEDURE — G8428 CUR MEDS NOT DOCUMENT: HCPCS | Performed by: INTERNAL MEDICINE

## 2024-02-06 PROCEDURE — 99204 OFFICE O/P NEW MOD 45 MIN: CPT | Performed by: INTERNAL MEDICINE

## 2024-02-06 PROCEDURE — 1036F TOBACCO NON-USER: CPT | Performed by: INTERNAL MEDICINE

## 2024-02-06 PROCEDURE — G8484 FLU IMMUNIZE NO ADMIN: HCPCS | Performed by: INTERNAL MEDICINE

## 2024-02-06 PROCEDURE — G8417 CALC BMI ABV UP PARAM F/U: HCPCS | Performed by: INTERNAL MEDICINE

## 2024-02-06 ASSESSMENT — SLEEP AND FATIGUE QUESTIONNAIRES
HOW LIKELY ARE YOU TO NOD OFF OR FALL ASLEEP IN A CAR, WHILE STOPPED FOR A FEW MINUTES IN TRAFFIC: 1
HOW LIKELY ARE YOU TO NOD OFF OR FALL ASLEEP WHILE SITTING AND READING: 3
HOW LIKELY ARE YOU TO NOD OFF OR FALL ASLEEP WHILE WATCHING TV: 3
ESS TOTAL SCORE: 17
HOW LIKELY ARE YOU TO NOD OFF OR FALL ASLEEP WHILE SITTING QUIETLY AFTER LUNCH WITHOUT ALCOHOL: 3
HOW LIKELY ARE YOU TO NOD OFF OR FALL ASLEEP WHILE SITTING AND TALKING TO SOMEONE: 1
HOW LIKELY ARE YOU TO NOD OFF OR FALL ASLEEP WHILE SITTING INACTIVE IN A PUBLIC PLACE: 1
HOW LIKELY ARE YOU TO NOD OFF OR FALL ASLEEP WHEN YOU ARE A PASSENGER IN A CAR FOR AN HOUR WITHOUT A BREAK: 2
HOW LIKELY ARE YOU TO NOD OFF OR FALL ASLEEP WHILE LYING DOWN TO REST IN THE AFTERNOON WHEN CIRCUMSTANCES PERMIT: 3

## 2024-02-06 NOTE — PROGRESS NOTES
Sitting and talking to someone 1   Sitting quietly after a lunch without alcohol 3   In a car, while stopped for a few minutes in traffic 1   [place] Sleepiness Score 17         SLEEP STUDY HISTORY      No specialty comments available.          PERTINENT LAB RESULTS  Ferritin   Date Value Ref Range Status   12/11/2023 44 ng/mL Final     Comment:       FERRITIN Reference Ranges:  Adult Males   20 - 60 years:    30 - 400 ng/mL  Adult females 17 - 60 years:    13 - 150 ng/mL  Adults greater than 60 years:   no established reference range  Pediatrics:  no established reference range        Lab Results   Component Value Date    WBC 6.2 08/01/2023    HGB 10.4 (L) 08/01/2023    HCT 37.6 08/01/2023    MCV 86.8 08/01/2023     08/01/2023      Lab Results   Component Value Date/Time     08/01/2023 07:49 AM    K 4.3 08/01/2023 07:49 AM     08/01/2023 07:49 AM    CO2 24 08/01/2023 07:49 AM    BUN 14 08/01/2023 07:49 AM    CREATININE 0.8 08/01/2023 07:49 AM    GLUCOSE 79 08/01/2023 07:49 AM    CALCIUM 9.0 08/01/2023 07:49 AM    LABGLOM >60 08/01/2023 07:49 AM

## 2024-02-09 ENCOUNTER — TELEPHONE (OUTPATIENT)
Dept: SLEEP CENTER | Age: 39
End: 2024-02-09

## 2024-02-11 DIAGNOSIS — G47.33 OSA (OBSTRUCTIVE SLEEP APNEA): Primary | ICD-10-CM

## 2024-02-22 ENCOUNTER — HOSPITAL ENCOUNTER (OUTPATIENT)
Dept: SLEEP CENTER | Age: 39
Discharge: HOME | End: 2024-02-22
Payer: MEDICARE

## 2024-02-22 DIAGNOSIS — G47.33 OSA (OBSTRUCTIVE SLEEP APNEA): ICD-10-CM

## 2024-02-22 PROCEDURE — 95811 POLYSOM 6/>YRS CPAP 4/> PARM: CPT

## 2024-03-11 ENCOUNTER — OFFICE VISIT (OUTPATIENT)
Dept: FAMILY MEDICINE CLINIC | Age: 39
End: 2024-03-11
Payer: COMMERCIAL

## 2024-03-11 VITALS
WEIGHT: 293 LBS | RESPIRATION RATE: 16 BRPM | DIASTOLIC BLOOD PRESSURE: 68 MMHG | HEIGHT: 67 IN | HEART RATE: 86 BPM | BODY MASS INDEX: 45.99 KG/M2 | TEMPERATURE: 97 F | SYSTOLIC BLOOD PRESSURE: 126 MMHG | OXYGEN SATURATION: 98 %

## 2024-03-11 DIAGNOSIS — Z13.6 SCREENING FOR CARDIOVASCULAR CONDITION: ICD-10-CM

## 2024-03-11 DIAGNOSIS — Z23 NEED FOR PROPHYLACTIC VACCINATION AGAINST DIPHTHERIA-TETANUS-PERTUSSIS (DTP): ICD-10-CM

## 2024-03-11 DIAGNOSIS — Z23 NEED FOR PROPHYLACTIC VACCINATION AGAINST STREPTOCOCCUS PNEUMONIAE (PNEUMOCOCCUS): ICD-10-CM

## 2024-03-11 DIAGNOSIS — Z00.00 WELCOME TO MEDICARE PREVENTIVE VISIT: Primary | ICD-10-CM

## 2024-03-11 PROCEDURE — 90677 PCV20 VACCINE IM: CPT | Performed by: NURSE PRACTITIONER

## 2024-03-11 PROCEDURE — G0402 INITIAL PREVENTIVE EXAM: HCPCS | Performed by: NURSE PRACTITIONER

## 2024-03-11 PROCEDURE — G0009 ADMIN PNEUMOCOCCAL VACCINE: HCPCS | Performed by: NURSE PRACTITIONER

## 2024-03-11 SDOH — ECONOMIC STABILITY: FOOD INSECURITY: WITHIN THE PAST 12 MONTHS, THE FOOD YOU BOUGHT JUST DIDN'T LAST AND YOU DIDN'T HAVE MONEY TO GET MORE.: SOMETIMES TRUE

## 2024-03-11 SDOH — HEALTH STABILITY: PHYSICAL HEALTH: ON AVERAGE, HOW MANY MINUTES DO YOU ENGAGE IN EXERCISE AT THIS LEVEL?: 0 MIN

## 2024-03-11 SDOH — ECONOMIC STABILITY: INCOME INSECURITY: HOW HARD IS IT FOR YOU TO PAY FOR THE VERY BASICS LIKE FOOD, HOUSING, MEDICAL CARE, AND HEATING?: SOMEWHAT HARD

## 2024-03-11 SDOH — HEALTH STABILITY: PHYSICAL HEALTH: ON AVERAGE, HOW MANY DAYS PER WEEK DO YOU ENGAGE IN MODERATE TO STRENUOUS EXERCISE (LIKE A BRISK WALK)?: 0 DAYS

## 2024-03-11 SDOH — ECONOMIC STABILITY: FOOD INSECURITY: WITHIN THE PAST 12 MONTHS, YOU WORRIED THAT YOUR FOOD WOULD RUN OUT BEFORE YOU GOT MONEY TO BUY MORE.: SOMETIMES TRUE

## 2024-03-11 ASSESSMENT — LIFESTYLE VARIABLES
HOW OFTEN DO YOU HAVE A DRINK CONTAINING ALCOHOL: NEVER
HOW OFTEN DO YOU HAVE SIX OR MORE DRINKS ON ONE OCCASION: 1
HOW MANY STANDARD DRINKS CONTAINING ALCOHOL DO YOU HAVE ON A TYPICAL DAY: 0
HOW MANY STANDARD DRINKS CONTAINING ALCOHOL DO YOU HAVE ON A TYPICAL DAY: PATIENT DOES NOT DRINK
HOW OFTEN DO YOU HAVE A DRINK CONTAINING ALCOHOL: 1

## 2024-03-11 ASSESSMENT — PATIENT HEALTH QUESTIONNAIRE - PHQ9
1. LITTLE INTEREST OR PLEASURE IN DOING THINGS: 2
SUM OF ALL RESPONSES TO PHQ QUESTIONS 1-9: 19
6. FEELING BAD ABOUT YOURSELF - OR THAT YOU ARE A FAILURE OR HAVE LET YOURSELF OR YOUR FAMILY DOWN: 3
SUM OF ALL RESPONSES TO PHQ9 QUESTIONS 1 & 2: 4
SUM OF ALL RESPONSES TO PHQ QUESTIONS 1-9: 19
4. FEELING TIRED OR HAVING LITTLE ENERGY: 3
3. TROUBLE FALLING OR STAYING ASLEEP: 3
5. POOR APPETITE OR OVEREATING: 3
10. IF YOU CHECKED OFF ANY PROBLEMS, HOW DIFFICULT HAVE THESE PROBLEMS MADE IT FOR YOU TO DO YOUR WORK, TAKE CARE OF THINGS AT HOME, OR GET ALONG WITH OTHER PEOPLE: 1
8. MOVING OR SPEAKING SO SLOWLY THAT OTHER PEOPLE COULD HAVE NOTICED. OR THE OPPOSITE, BEING SO FIGETY OR RESTLESS THAT YOU HAVE BEEN MOVING AROUND A LOT MORE THAN USUAL: 0
7. TROUBLE CONCENTRATING ON THINGS, SUCH AS READING THE NEWSPAPER OR WATCHING TELEVISION: 3
2. FEELING DOWN, DEPRESSED OR HOPELESS: 2
SUM OF ALL RESPONSES TO PHQ QUESTIONS 1-9: 19
9. THOUGHTS THAT YOU WOULD BE BETTER OFF DEAD, OR OF HURTING YOURSELF: 0
SUM OF ALL RESPONSES TO PHQ QUESTIONS 1-9: 19

## 2024-03-11 ASSESSMENT — VISUAL ACUITY
OS_CC: 20/40
OD_CC: 20/40

## 2024-03-11 NOTE — PROGRESS NOTES
Medicare Annual Wellness Visit    July Alvarado is here for Medicare AWV (Welcome to medicare)    Assessment & Plan   Welcome to Medicare preventive visit  Need for prophylactic vaccination against Streptococcus pneumoniae (pneumococcus)  -     Pneumococcal Conjugate PCV20, PF (Prevnar 20)  Need for prophylactic vaccination against diphtheria-tetanus-pertussis (DTP)  -     Tdap (ADACEL) 5-2-15.5 LF-MCG/0.5 injection; Inject 0.5 mLs into the muscle once for 1 dose, Disp-0.5 mL, R-0Print  Screening for cardiovascular condition  -     Lipid Panel; Future    Recommendations for Preventive Services Due: see orders and patient instructions/AVS.  Recommended screening schedule for the next 5-10 years is provided to the patient in written form: see Patient Instructions/AVS.     Return in 3 months (on 6/11/2024) for Asthma and then MWV in 1 year.     Subjective       Patient's complete Health Risk Assessment and screening values have been reviewed and are found in Flowsheets. The following problems were reviewed today and where indicated follow up appointments were made and/or referrals ordered.    Positive Risk Factor Screenings with Interventions:        Depression:  PHQ-2 Score: 4  PHQ-9 Total Score: 19    Interpretation:  5-9 mild   10-14 moderate   15-19 moderately severe   20-27 severe   Interventions:  Patient comments: follows with psychiatry and seen this morning  She is also going to be getting her CPAP soon due to severe KAUR     Drug Use:   Substance and Sexual Activity   Drug Use Yes    Frequency: 7.0 times per week    Types: Marijuana (Weed)    Comment: Has active medical marijuana card beginning 3/2021     Interventions:  Patient comments: has medical marijuana card and this is what she uses.          Activity, Diet, and Weight:  On average, how many days per week do you engage in moderate to strenuous exercise (like a brisk walk)?: 0 days  On average, how many minutes do you engage in exercise at this

## 2024-04-05 DIAGNOSIS — G47.33 OSA (OBSTRUCTIVE SLEEP APNEA): Primary | ICD-10-CM

## 2024-06-10 ENCOUNTER — OFFICE VISIT (OUTPATIENT)
Dept: SLEEP CENTER | Age: 39
End: 2024-06-10
Payer: COMMERCIAL

## 2024-06-10 VITALS
DIASTOLIC BLOOD PRESSURE: 79 MMHG | BODY MASS INDEX: 47.09 KG/M2 | WEIGHT: 293 LBS | HEART RATE: 109 BPM | RESPIRATION RATE: 16 BRPM | SYSTOLIC BLOOD PRESSURE: 126 MMHG | OXYGEN SATURATION: 94 % | HEIGHT: 66 IN

## 2024-06-10 DIAGNOSIS — G47.34 NOCTURNAL OXYGEN DESATURATION: ICD-10-CM

## 2024-06-10 DIAGNOSIS — G47.33 OBSTRUCTIVE SLEEP APNEA: Primary | ICD-10-CM

## 2024-06-10 PROCEDURE — G8427 DOCREV CUR MEDS BY ELIG CLIN: HCPCS | Performed by: NURSE PRACTITIONER

## 2024-06-10 PROCEDURE — 99214 OFFICE O/P EST MOD 30 MIN: CPT | Performed by: NURSE PRACTITIONER

## 2024-06-10 PROCEDURE — G8417 CALC BMI ABV UP PARAM F/U: HCPCS | Performed by: NURSE PRACTITIONER

## 2024-06-10 PROCEDURE — 1036F TOBACCO NON-USER: CPT | Performed by: NURSE PRACTITIONER

## 2024-06-10 ASSESSMENT — SLEEP AND FATIGUE QUESTIONNAIRES
HOW LIKELY ARE YOU TO NOD OFF OR FALL ASLEEP WHILE SITTING INACTIVE IN A PUBLIC PLACE: WOULD NEVER DOZE
HOW LIKELY ARE YOU TO NOD OFF OR FALL ASLEEP IN A CAR, WHILE STOPPED FOR A FEW MINUTES IN TRAFFIC: WOULD NEVER DOZE
HOW LIKELY ARE YOU TO NOD OFF OR FALL ASLEEP WHEN YOU ARE A PASSENGER IN A CAR FOR AN HOUR WITHOUT A BREAK: MODERATE CHANCE OF DOZING
HOW LIKELY ARE YOU TO NOD OFF OR FALL ASLEEP WHILE LYING DOWN TO REST IN THE AFTERNOON WHEN CIRCUMSTANCES PERMIT: HIGH CHANCE OF DOZING
HOW LIKELY ARE YOU TO NOD OFF OR FALL ASLEEP WHILE WATCHING TV: MODERATE CHANCE OF DOZING
HOW LIKELY ARE YOU TO NOD OFF OR FALL ASLEEP WHILE SITTING AND READING: MODERATE CHANCE OF DOZING
HOW LIKELY ARE YOU TO NOD OFF OR FALL ASLEEP WHILE SITTING QUIETLY AFTER LUNCH WITHOUT ALCOHOL: MODERATE CHANCE OF DOZING
ESS TOTAL SCORE: 11

## 2024-06-10 NOTE — PROGRESS NOTES
[facility] Sleep Medicine    Patient Name: July Alvarado  Age: 38 y.o.   : 1985    Date of Visit: 6/10/24        Review of Last Visit Summary:    The patient was last seen on 2024 by Dr. Leon for  Obstructive Sleep Apnea.    Interim History:     July Alvarado is a 38 y.o. female that  has a past medical history of ADHD (attention deficit hyperactivity disorder), Allergic rhinitis, Asthma, Bipolar disorder ([facility]), Chronic pain of both knees, Depression, GERD (gastroesophageal reflux disease) (2021), Headache (), Irritable bowel syndrome, Obesity, Osteoarthritis, Personality disorder ([facility]), PTSD (post-traumatic stress disorder), and Substance abuse ([facility]). She presents in follow up to Sleep Clinic to review CPAP adherence and efficacy.     Interval Events:    -Completed titration study 24 and started on auto CPAP following.  Diagnostic study showed moderate KAUR that worsens to severe degree and REM sleep with significant hypoxia.  -Has a history of asthma, previous history of smoking socially, continues vaping and with medical marijuana.  -As far as CPAP, she does benefit from use when she is able to tolerate the mask.  On nights that she is able to wear CPAP, she feels refreshed the following day and does not feel the need to take a nap.  She also notes resolution of snoring with CPAP.  -Uses a nasal mask, does wake up with mask off or dislodged.  Does not feel she can tolerate a fullface mask.  Using the DreamStation nasal mask alternating between small and medium.  -CPAP usage has been nonexistent the past few weeks because she has been sleeping in her living room to take care of her child's dog.  -Sleeping mainly on left side.  -She has not quite yet met her insurance compliance requirements, however has until the end of July to do so.    DME: [facility]    Sleep Study History: 1.  2024-PSG-wt 364 lbs-sleep efficiency 73%, REM sleep 8%, AHI 19.3, REM AHI 40, SpO2 sunni 75%, T <88%

## 2024-07-07 NOTE — PROGRESS NOTES
[facility] Sleep Medicine    Patient Name: July Alvarado  Age: 38 y.o.   : 1985    Date of Visit: 24        Review of Last Visit Summary:    The patient was last seen on 6/10/2024 for  Obstructive Sleep Apnea.    Interim History:     July Alvarado is a 38 y.o. female that  has a past medical history of ADHD (attention deficit hyperactivity disorder), Allergic rhinitis, Asthma, Bipolar disorder ([facility]), Chronic pain of both knees, Depression, GERD (gastroesophageal reflux disease) (2021), Headache (), Irritable bowel syndrome, Obesity, Osteoarthritis, Personality disorder ([facility]), PTSD (post-traumatic stress disorder), and Substance abuse ([facility]). She presents in follow up to Sleep Clinic to review CPAP adherence and efficacy.     Interval Events:    -Continues to attempt CPAP usage, has been using CPAP more other than a few days where she ran out of distilled water.  -Sleeps well with CPAP, does note benefit, wakes up refreshed.  -She admits she has been off of her psych meds for a period of time and mood plays a role in her daytime energy levels.  She recently picked up her medications and will restart.  -Uses a nasal mask, forgot to call DME back for previously ordered mask fitting.  Currently she is not having issues with mask.  She is happy with fit.  -She did not receive a call about pulmonary consult that was ordered last visit.  -Motivated to continue CPAP, insurance compliance requirements not quite met.    DME:[facility]    Sleep Study History: 1.  2024-PSG-wt 364 lbs-sleep efficiency 73%, REM sleep 8%, AHI 19.3, REM AHI 40, SpO2 sunni 75%, T <88% was 78 minutes of total sleep time.    2.   for-titration-wt 367, sleep efficiency 87%, REM sleep 32%, best sleep and oxygenation appeared to be on CPAP of 14 with an AHI of 1.7, SpO2 sunni of 82% (briefly) and mean average O2 sat of 91% this included nonsupine REM sleep.    Sleep History:  STOP-BANG:  Snore- yes         Tired-

## 2024-07-08 ENCOUNTER — OFFICE VISIT (OUTPATIENT)
Dept: SLEEP CENTER | Age: 39
End: 2024-07-08
Payer: COMMERCIAL

## 2024-07-08 VITALS
RESPIRATION RATE: 16 BRPM | HEART RATE: 102 BPM | OXYGEN SATURATION: 95 % | SYSTOLIC BLOOD PRESSURE: 111 MMHG | DIASTOLIC BLOOD PRESSURE: 78 MMHG

## 2024-07-08 DIAGNOSIS — G47.34 NOCTURNAL OXYGEN DESATURATION: ICD-10-CM

## 2024-07-08 DIAGNOSIS — G47.33 OBSTRUCTIVE SLEEP APNEA: Primary | ICD-10-CM

## 2024-07-08 PROCEDURE — G8417 CALC BMI ABV UP PARAM F/U: HCPCS | Performed by: NURSE PRACTITIONER

## 2024-07-08 PROCEDURE — 1036F TOBACCO NON-USER: CPT | Performed by: NURSE PRACTITIONER

## 2024-07-08 PROCEDURE — 99214 OFFICE O/P EST MOD 30 MIN: CPT | Performed by: NURSE PRACTITIONER

## 2024-07-08 PROCEDURE — G8427 DOCREV CUR MEDS BY ELIG CLIN: HCPCS | Performed by: NURSE PRACTITIONER

## 2024-07-08 ASSESSMENT — SLEEP AND FATIGUE QUESTIONNAIRES
HOW LIKELY ARE YOU TO NOD OFF OR FALL ASLEEP WHILE SITTING INACTIVE IN A PUBLIC PLACE: WOULD NEVER DOZE
HOW LIKELY ARE YOU TO NOD OFF OR FALL ASLEEP WHILE SITTING AND READING: SLIGHT CHANCE OF DOZING
HOW LIKELY ARE YOU TO NOD OFF OR FALL ASLEEP WHILE SITTING QUIETLY AFTER LUNCH WITHOUT ALCOHOL: SLIGHT CHANCE OF DOZING
HOW LIKELY ARE YOU TO NOD OFF OR FALL ASLEEP WHILE SITTING AND TALKING TO SOMEONE: WOULD NEVER DOZE
ESS TOTAL SCORE: 5
HOW LIKELY ARE YOU TO NOD OFF OR FALL ASLEEP WHILE WATCHING TV: WOULD NEVER DOZE
HOW LIKELY ARE YOU TO NOD OFF OR FALL ASLEEP WHILE LYING DOWN TO REST IN THE AFTERNOON WHEN CIRCUMSTANCES PERMIT: HIGH CHANCE OF DOZING
HOW LIKELY ARE YOU TO NOD OFF OR FALL ASLEEP WHEN YOU ARE A PASSENGER IN A CAR FOR AN HOUR WITHOUT A BREAK: WOULD NEVER DOZE
HOW LIKELY ARE YOU TO NOD OFF OR FALL ASLEEP IN A CAR, WHILE STOPPED FOR A FEW MINUTES IN TRAFFIC: WOULD NEVER DOZE

## 2024-07-15 ENCOUNTER — HOSPITAL ENCOUNTER (EMERGENCY)
Age: 39
Discharge: HOME | End: 2024-07-15
Attending: EMERGENCY MEDICINE
Payer: COMMERCIAL

## 2024-07-15 ENCOUNTER — APPOINTMENT (OUTPATIENT)
Dept: CT IMAGING | Age: 39
End: 2024-07-15
Payer: COMMERCIAL

## 2024-07-15 VITALS
BODY MASS INDEX: 45.99 KG/M2 | HEIGHT: 67 IN | SYSTOLIC BLOOD PRESSURE: 128 MMHG | WEIGHT: 293 LBS | OXYGEN SATURATION: 100 % | DIASTOLIC BLOOD PRESSURE: 64 MMHG | HEART RATE: 92 BPM | TEMPERATURE: 98.3 F | RESPIRATION RATE: 20 BRPM

## 2024-07-15 DIAGNOSIS — J98.4 PNEUMONITIS: ICD-10-CM

## 2024-07-15 DIAGNOSIS — L03.316 CELLULITIS OF UMBILICUS: Primary | ICD-10-CM

## 2024-07-15 LAB
ALBUMIN SERPL-MCNC: 4.1 G/DL (ref 3.5–5.2)
ALP SERPL-CCNC: 97 U/L (ref 35–104)
ALT SERPL-CCNC: 12 U/L (ref 0–32)
ANION GAP SERPL CALCULATED.3IONS-SCNC: 10 MMOL/L (ref 7–16)
AST SERPL-CCNC: 17 U/L (ref 0–31)
BASOPHILS # BLD: 0.08 K/UL (ref 0–0.2)
BASOPHILS NFR BLD: 1 % (ref 0–2)
BILIRUB SERPL-MCNC: 0.5 MG/DL (ref 0–1.2)
BUN SERPL-MCNC: 15 MG/DL (ref 6–20)
CALCIUM SERPL-MCNC: 9.2 MG/DL (ref 8.6–10.2)
CHLORIDE SERPL-SCNC: 105 MMOL/L (ref 98–107)
CO2 SERPL-SCNC: 24 MMOL/L (ref 22–29)
CREAT SERPL-MCNC: 0.9 MG/DL (ref 0.5–1)
EOSINOPHIL # BLD: 0.23 K/UL (ref 0.05–0.5)
EOSINOPHILS RELATIVE PERCENT: 3 % (ref 0–6)
ERYTHROCYTE [DISTWIDTH] IN BLOOD BY AUTOMATED COUNT: 15.3 % (ref 11.5–15)
GFR, ESTIMATED: >90 ML/MIN/1.73M2
GLUCOSE SERPL-MCNC: 91 MG/DL (ref 74–99)
HCG, URINE, POC: NEGATIVE
HCT VFR BLD AUTO: 35.5 % (ref 34–48)
HGB BLD-MCNC: 11 G/DL (ref 11.5–15.5)
LACTATE BLDV-SCNC: 0.8 MMOL/L (ref 0.5–1.9)
LYMPHOCYTES NFR BLD: 1.91 K/UL (ref 1.5–4)
LYMPHOCYTES RELATIVE PERCENT: 22 % (ref 20–42)
Lab: NORMAL
MCH RBC QN AUTO: 25.3 PG (ref 26–35)
MCHC RBC AUTO-ENTMCNC: 31 G/DL (ref 32–34.5)
MCV RBC AUTO: 81.6 FL (ref 80–99.9)
MONOCYTES NFR BLD: 0.23 K/UL (ref 0.1–0.95)
MONOCYTES NFR BLD: 3 % (ref 2–12)
NEGATIVE QC PASS/FAIL: NORMAL
NEUTROPHILS NFR BLD: 72 % (ref 43–80)
NEUTS SEG NFR BLD: 6.35 K/UL (ref 1.8–7.3)
PLATELET CONFIRMATION: NORMAL
PLATELET, FLUORESCENCE: 214 K/UL (ref 130–450)
PMV BLD AUTO: 10.8 FL (ref 7–12)
POSITIVE QC PASS/FAIL: NORMAL
POTASSIUM SERPL-SCNC: 4.8 MMOL/L (ref 3.5–5)
PROT SERPL-MCNC: 7.3 G/DL (ref 6.4–8.3)
RBC # BLD AUTO: 4.35 M/UL (ref 3.5–5.5)
RBC # BLD: ABNORMAL 10*6/UL
RBC # BLD: ABNORMAL 10*6/UL
SODIUM SERPL-SCNC: 139 MMOL/L (ref 132–146)
WBC OTHER # BLD: 8.8 K/UL (ref 4.5–11.5)

## 2024-07-15 PROCEDURE — 6370000000 HC RX 637 (ALT 250 FOR IP)

## 2024-07-15 PROCEDURE — 87040 BLOOD CULTURE FOR BACTERIA: CPT

## 2024-07-15 PROCEDURE — 85025 COMPLETE CBC W/AUTO DIFF WBC: CPT

## 2024-07-15 PROCEDURE — 6360000004 HC RX CONTRAST MEDICATION: Performed by: RADIOLOGY

## 2024-07-15 PROCEDURE — 80053 COMPREHEN METABOLIC PANEL: CPT

## 2024-07-15 PROCEDURE — 99285 EMERGENCY DEPT VISIT HI MDM: CPT

## 2024-07-15 PROCEDURE — 74177 CT ABD & PELVIS W/CONTRAST: CPT

## 2024-07-15 PROCEDURE — 83605 ASSAY OF LACTIC ACID: CPT

## 2024-07-15 PROCEDURE — 36415 COLL VENOUS BLD VENIPUNCTURE: CPT

## 2024-07-15 RX ORDER — DOXYCYCLINE HYCLATE 100 MG/1
100 CAPSULE ORAL ONCE
Status: COMPLETED | OUTPATIENT
Start: 2024-07-15 | End: 2024-07-15

## 2024-07-15 RX ORDER — DOXYCYCLINE HYCLATE 100 MG
100 TABLET ORAL 2 TIMES DAILY
Qty: 14 TABLET | Refills: 0 | Status: SHIPPED | OUTPATIENT
Start: 2024-07-15 | End: 2024-07-22

## 2024-07-15 RX ORDER — CEPHALEXIN 500 MG/1
500 CAPSULE ORAL 4 TIMES DAILY
Qty: 28 CAPSULE | Refills: 0 | Status: SHIPPED | OUTPATIENT
Start: 2024-07-15 | End: 2024-07-22

## 2024-07-15 RX ORDER — CEPHALEXIN 250 MG/1
500 CAPSULE ORAL ONCE
Status: COMPLETED | OUTPATIENT
Start: 2024-07-15 | End: 2024-07-15

## 2024-07-15 RX ADMIN — CEPHALEXIN 500 MG: 250 CAPSULE ORAL at 16:02

## 2024-07-15 RX ADMIN — DOXYCYCLINE HYCLATE 100 MG: 100 CAPSULE ORAL at 16:02

## 2024-07-15 RX ADMIN — IOPAMIDOL 75 ML: 755 INJECTION, SOLUTION INTRAVENOUS at 13:56

## 2024-07-15 ASSESSMENT — PAIN DESCRIPTION - LOCATION: LOCATION: ABDOMEN

## 2024-07-15 ASSESSMENT — LIFESTYLE VARIABLES
HOW OFTEN DO YOU HAVE A DRINK CONTAINING ALCOHOL: NEVER
HOW MANY STANDARD DRINKS CONTAINING ALCOHOL DO YOU HAVE ON A TYPICAL DAY: PATIENT DOES NOT DRINK

## 2024-07-15 ASSESSMENT — PAIN - FUNCTIONAL ASSESSMENT: PAIN_FUNCTIONAL_ASSESSMENT: 0-10

## 2024-07-15 ASSESSMENT — PAIN SCALES - GENERAL: PAINLEVEL_OUTOF10: 7

## 2024-07-15 NOTE — ED NOTES
Department of Emergency Medicine  FIRST PROVIDER TRIAGE NOTE             Independent MLP           7/15/24  10:38 AM EDT    Date of Encounter: 7/15/24   MRN: [id number]      HPI: July Alvarado is a 38 y.o. female who presents to the ED for No chief complaint on file.  Pain and swelling behind the umbilicus now draining no hx DM, NO FEVERES     ROS: Negative for Suicidal ideation or Homicidal Ideation.    PE: Gen Appearance/Constitutional: alert  CV: regular rate  Pulm: CTA bilat     Initial Plan of Care: All treatment areas with department are currently occupied. Plan to order/Initiate the following while awaiting opening in ED: possible  I and  D .  Initiate Treatment-Testing, Proceed toTreatment Area When Bed Available for ED Attending/MLP to Continue Care    Electronically signed by CARLI Hutson   DD: 7/15/24

## 2024-07-15 NOTE — DISCHARGE INSTRUCTIONS
Please call and follow-up with your family physician as soon as possible.  Return to emergency department if your symptoms persist or worsen or if you develop fevers.  Take antibiotics for full course as prescribed.

## 2024-07-15 NOTE — ED PROVIDER NOTES
[facility] EMERGENCY DEPARTMENT  EMERGENCY DEPARTMENT ENCOUNTER        Pt Name: July Alvarado  MRN: [id number]  Birthdate 1985  Date of evaluation: 7/15/2024  Provider: Carlos Weller DO  PCP: Alma Medina, GEORGE - CNP  Note Started: 12:09 PM EDT 7/15/24    CHIEF COMPLAINT       Chief Complaint   Patient presents with    Wound Check     Reports that umbilicus is draining malodorous brown discharge, started yesterday. States that she felt a mass. Pain 7/10.        HISTORY OF PRESENT ILLNESS: 1 or more Elements   July Alvarado is a 38 y.o. female who presents to the emergency department with chief complaint of wound concerns.  Patient states that she noticed this yesterday where she had a sharp pain in the bellybutton when she was sitting up and felt near her bellybutton and had a firm mass in the umbilical region.  She states that later that evening it started draining a yellowish-brownish discharge that had a bad smell to it.  She states that since that time she has had persistent drainage from it but the pain has improved.  She states that is a dull achy sensation at this time.  She does not have any pain anywhere else.  She has no significant intra-abdominal history and denies any fever, chills, nausea, vomiting, chest pain, shortness of breath, hematuria or dysuria, vaginal bleeding or discharge, constipation or diarrhea.    Nursing Notes were all reviewed and agreed with or any disagreements were addressed in the HPI.    REVIEW OF SYSTEMS :    Positives and Pertinent negatives as per HPI.    PAST MEDICAL HISTORY/Chronic Conditions Affecting Care    has a past medical history of ADHD (attention deficit hyperactivity disorder), Allergic rhinitis, Asthma, Bipolar disorder ([facility]), Chronic pain of both knees, Depression, GERD (gastroesophageal reflux disease) (4/2021), Headache (2019), Irritable bowel syndrome, Obesity, Osteoarthritis, Personality disorder ([facility]), PTSD

## 2024-07-20 LAB
MICROORGANISM SPEC CULT: NORMAL
MICROORGANISM SPEC CULT: NORMAL
SERVICE CMNT-IMP: NORMAL
SERVICE CMNT-IMP: NORMAL
SPECIMEN DESCRIPTION: NORMAL
SPECIMEN DESCRIPTION: NORMAL

## 2024-07-22 ENCOUNTER — OFFICE VISIT (OUTPATIENT)
Dept: FAMILY MEDICINE CLINIC | Age: 39
End: 2024-07-22
Payer: COMMERCIAL

## 2024-07-22 VITALS
DIASTOLIC BLOOD PRESSURE: 64 MMHG | HEART RATE: 75 BPM | HEIGHT: 67 IN | OXYGEN SATURATION: 97 % | SYSTOLIC BLOOD PRESSURE: 120 MMHG | BODY MASS INDEX: 45.99 KG/M2 | TEMPERATURE: 97.3 F | RESPIRATION RATE: 16 BRPM | WEIGHT: 293 LBS

## 2024-07-22 DIAGNOSIS — D25.9 UTERINE LEIOMYOMA, UNSPECIFIED LOCATION: ICD-10-CM

## 2024-07-22 DIAGNOSIS — R19.8 UMBILICUS DISCHARGE: Primary | ICD-10-CM

## 2024-07-22 PROCEDURE — 99213 OFFICE O/P EST LOW 20 MIN: CPT | Performed by: NURSE PRACTITIONER

## 2024-07-22 PROCEDURE — G8427 DOCREV CUR MEDS BY ELIG CLIN: HCPCS | Performed by: NURSE PRACTITIONER

## 2024-07-22 PROCEDURE — 1036F TOBACCO NON-USER: CPT | Performed by: NURSE PRACTITIONER

## 2024-07-22 PROCEDURE — G8417 CALC BMI ABV UP PARAM F/U: HCPCS | Performed by: NURSE PRACTITIONER

## 2024-07-22 ASSESSMENT — ENCOUNTER SYMPTOMS
ABDOMINAL PAIN: 0
DIARRHEA: 0
COUGH: 0
VOMITING: 0
CONSTIPATION: 0
NAUSEA: 0
WHEEZING: 0
SHORTNESS OF BREATH: 0

## 2024-07-22 NOTE — ASSESSMENT & PLAN NOTE
Unclear unsure if any trauma pale yellow drainage on cotton tipped applicator, no lesions noted in the umbilicus or perforations.

## 2024-07-22 NOTE — PROGRESS NOTES
OFFICE PROGRESS NOTE  [facility] [place] [facility]  [address] [place] [address]  Dept: [phone]   Chief Complaint   Patient presents with    Follow-up     ER follow up, doing better since ER        ASSESSMENT/PLAN   1. Umbilicus discharge  Assessment & Plan:  Unclear unsure if any trauma pale yellow drainage on cotton tipped applicator, no lesions noted in the umbilicus or perforations.   Orders:  -     Ambulatory referral to General Surgery  2. Uterine leiomyoma, unspecified location  Assessment & Plan:  Referral to gyn  Orders:  -     Amb External Referral To Gynecology       Reviewed labs: CMP, CBCD,  Reviewed notes from ER notes  Reviewed radiology CT abd/pelvis    Discussed weight loss, Discussed exercising 30 minutes daily, and Discussed taking medications as directed and adverse effects    Return in about 6 months (around 1/22/2025), or if symptoms worsen or fail to improve, for establish care new provider.     HPI:   Here today for follow up from ER 7/15/24 for complaints of a mass in the abdomen, the a couple of hours later she noted drainage from her umbilicus and pain initially once it started to drain the pain resolved,Initially it was bloody and was thick yellow with crusting CT abdomen/pelvis done didn't show anything above the umbilicus.. She is having minimal drainage at this time. Last PAP 2019. She does have a pale yellow drainage note on the cotton tipped applicator today. She was placed on Keflex 500 mg QID and doxycycline 100 mg BID for 7 days    Lab Results   Component Value Date     07/15/2024    K 4.8 07/15/2024     07/15/2024    CO2 24 07/15/2024    BUN 15 07/15/2024    CREATININE 0.9 07/15/2024    GLUCOSE 91 07/15/2024    CALCIUM 9.2 07/15/2024    BILITOT 0.5 07/15/2024    ALKPHOS 97 07/15/2024    AST 17 07/15/2024    ALT 12 07/15/2024    LABGLOM >90 07/15/2024    GFRAA >60 04/26/2022       Lab Results   Component Value Date    WBC

## 2024-07-25 ENCOUNTER — INITIAL CONSULT (OUTPATIENT)
Dept: SURGERY | Age: 39
End: 2024-07-25
Payer: COMMERCIAL

## 2024-07-25 VITALS
WEIGHT: 293 LBS | BODY MASS INDEX: 45.99 KG/M2 | HEIGHT: 67 IN | SYSTOLIC BLOOD PRESSURE: 122 MMHG | TEMPERATURE: 97.9 F | HEART RATE: 86 BPM | DIASTOLIC BLOOD PRESSURE: 72 MMHG

## 2024-07-25 DIAGNOSIS — B36.9 FUNGAL DERMATITIS: Primary | ICD-10-CM

## 2024-07-25 PROCEDURE — G8417 CALC BMI ABV UP PARAM F/U: HCPCS | Performed by: SURGERY

## 2024-07-25 PROCEDURE — 99203 OFFICE O/P NEW LOW 30 MIN: CPT | Performed by: SURGERY

## 2024-07-25 PROCEDURE — G8427 DOCREV CUR MEDS BY ELIG CLIN: HCPCS | Performed by: SURGERY

## 2024-07-25 PROCEDURE — 1036F TOBACCO NON-USER: CPT | Performed by: SURGERY

## 2024-07-25 RX ORDER — FLUCONAZOLE 100 MG/1
200 TABLET ORAL DAILY
Qty: 14 TABLET | Refills: 0 | Status: SHIPPED | OUTPATIENT
Start: 2024-07-25 | End: 2024-08-01

## 2024-07-25 NOTE — PROGRESS NOTES
Intimate Partner Violence: Not on file   Housing Stability: Unknown (3/11/2024)    Housing Stability Vital Sign     Unable to Pay for Housing in the Last Year: Not on file     Number of Places Lived in the Last Year: Not on file     Unstable Housing in the Last Year: No           Review of Systems  Review of Systems -  General ROS: negative for - chills, fatigue or malaise  ENT ROS: negative for - hearing change, nasal congestion or nasal discharge  Allergy and Immunology ROS: negative for - hives, itchy/watery eyes or nasal congestion  Hematological and Lymphatic ROS: negative for - blood clots, blood transfusions, bruising or fatigue  Endocrine ROS: negative for - malaise/lethargy, mood swings, palpitations or polydipsia/polyuria  Respiratory ROS: negative for - sputum changes, stridor, tachypnea or wheezing  Cardiovascular ROS: negative for - irregular heartbeat, loss of consciousness, murmur or orthopnea  Gastrointestinal ROS: negative for - constipation, diarrhea, gas/bloating, heartburn or hematemesis  Genito-Urinary ROS: negative for -  genital discharge, genital ulcers or hematuria  Musculoskeletal ROS: negative for - gait disturbance, muscle pain or muscular weakness    Physical exam:   /72 (Site: Right Upper Arm, Position: Sitting, Cuff Size: Medium Adult)   Pulse 86   Temp 97.9 °F (36.6 °C)   Ht 1.702 m (5' 7\")   Wt (!) 173.7 kg (383 lb)   LMP 07/02/2024   BMI 59.99 kg/m²   General appearance:  NAD  Head: NCAT, PERRLA, EOMI, red conjunctiva  Neck: supple, no masses  Lungs: CTAB, equal chest rise bilateral  Heart: Reg rate  Abdomen: soft, nontender, nondistended, fungal dermatitis under pannus and at umbilicus  Skin; no lesions  Gu: no cva tenderness  Extremities: extremities normal, atraumatic, no cyanosis or edema      Radiology:  CT abdomen/pelvis: no acute path    Assessment:  38 y.o. female with umbilical drainage and fungal dermatitis    Plan:  Cbc, cmp, coag and will give diflucan

## 2024-08-14 ENCOUNTER — OFFICE VISIT (OUTPATIENT)
Dept: PULMONOLOGY | Age: 39
End: 2024-08-14
Payer: COMMERCIAL

## 2024-08-14 VITALS
HEIGHT: 67 IN | OXYGEN SATURATION: 98 % | WEIGHT: 293 LBS | DIASTOLIC BLOOD PRESSURE: 70 MMHG | HEART RATE: 91 BPM | BODY MASS INDEX: 45.99 KG/M2 | TEMPERATURE: 97.3 F | SYSTOLIC BLOOD PRESSURE: 120 MMHG

## 2024-08-14 DIAGNOSIS — J84.9 INTERSTITIAL LUNG DISEASE (HCC): Primary | ICD-10-CM

## 2024-08-14 DIAGNOSIS — E66.2 OBESITY HYPOVENTILATION SYNDROME (HCC): ICD-10-CM

## 2024-08-14 DIAGNOSIS — E66.01 MORBID OBESITY (HCC): ICD-10-CM

## 2024-08-14 DIAGNOSIS — J45.909 UNCOMPLICATED ASTHMA, UNSPECIFIED ASTHMA SEVERITY, UNSPECIFIED WHETHER PERSISTENT: ICD-10-CM

## 2024-08-14 PROCEDURE — G8427 DOCREV CUR MEDS BY ELIG CLIN: HCPCS | Performed by: INTERNAL MEDICINE

## 2024-08-14 PROCEDURE — 99204 OFFICE O/P NEW MOD 45 MIN: CPT | Performed by: INTERNAL MEDICINE

## 2024-08-14 PROCEDURE — 1036F TOBACCO NON-USER: CPT | Performed by: INTERNAL MEDICINE

## 2024-08-14 PROCEDURE — G8417 CALC BMI ABV UP PARAM F/U: HCPCS | Performed by: INTERNAL MEDICINE

## 2024-08-14 ASSESSMENT — ENCOUNTER SYMPTOMS
WHEEZING: 1
GASTROINTESTINAL NEGATIVE: 1
EYES NEGATIVE: 1
SHORTNESS OF BREATH: 1
COUGH: 1

## 2024-08-14 NOTE — PROGRESS NOTES
Progress Note    July Alvarado  1985    CC:nocturnal O2 desaturation       HPI : 38 year old female has KAUR and has been on CPAP and she has been referred from sleep clinic due TO nocturnal oxygen desaturation. She recently had abdominal wound infection, had abdominal CT, showed GGO in lower lung fields. Former smoker, for about 10 years, smoked <1 pack a day, she smokes marijuana now, smokes daily. She has been using Symbicort for asthma which was diagnosed 26 years AGO. Mother has asthma, she has environmental allergy, she has two dogs at home. She uses rescue inhaler average 4-5 times in a week. She wakes up from sleep some time due to trouble breathing and uses rescue inhaler.     Past Medical History:   Diagnosis Date    ADHD (attention deficit hyperactivity disorder)     Allergic rhinitis     Asthma     Bipolar disorder (HCC)     Chronic pain of both knees     Depression     GERD (gastroesophageal reflux disease) 4/2021    Unexplained vomiting    Headache 2019    Ongoing migraine issue    Irritable bowel syndrome     Obesity     Osteoarthritis     Personality disorder (HCC)     PTSD (post-traumatic stress disorder)     Substance abuse ([facility])       No past surgical history on file.   Family History   Problem Relation Age of Onset    Early Death Maternal Uncle         Premature due to car accident    Cancer Maternal Grandmother         Stomach    Alcohol Abuse Maternal Grandmother     Heart Attack Maternal Grandmother     Miscarriages / Stillbirths Maternal Grandmother     Asthma Mother     Diabetes Mother     High Cholesterol Mother     High Blood Pressure Mother     Arthritis Mother     Allergy (Severe) Mother         Percocet, Darvocet    Hearing Loss Mother     Obesity Mother     Other Mother         Sleep apnea, Irritable bowel syndrome    Diabetes Father     High Cholesterol Father     Arthritis Father     Asthma Father     High Blood Pressure Father     Obesity Father     Other Father

## 2024-08-14 NOTE — PROGRESS NOTES
Patient to follow up with physician in one (1) month. Orders for PFT, Chest CT will be scheduled with [facility]. Orders for Overnight pulse Ox will be sent to [facility]. Orders for blood work was given to the patient to have done today.

## 2024-09-12 ENCOUNTER — TELEPHONE (OUTPATIENT)
Dept: SLEEP CENTER | Age: 39
End: 2024-09-12

## 2024-09-18 ENCOUNTER — HOSPITAL ENCOUNTER (OUTPATIENT)
Dept: CT IMAGING | Age: 39
Discharge: HOME | End: 2024-09-18
Attending: INTERNAL MEDICINE
Payer: COMMERCIAL

## 2024-09-18 ENCOUNTER — HOSPITAL ENCOUNTER (OUTPATIENT)
Dept: PULMONOLOGY | Age: 39
Discharge: HOME | End: 2024-09-18
Attending: INTERNAL MEDICINE
Payer: COMMERCIAL

## 2024-09-18 DIAGNOSIS — J84.9 INTERSTITIAL LUNG DISEASE (HCC): ICD-10-CM

## 2024-09-18 DIAGNOSIS — J45.909 UNCOMPLICATED ASTHMA, UNSPECIFIED ASTHMA SEVERITY, UNSPECIFIED WHETHER PERSISTENT: ICD-10-CM

## 2024-09-18 PROCEDURE — 94726 PLETHYSMOGRAPHY LUNG VOLUMES: CPT | Performed by: INTERNAL MEDICINE

## 2024-09-18 PROCEDURE — 94729 DIFFUSING CAPACITY: CPT | Performed by: INTERNAL MEDICINE

## 2024-09-18 PROCEDURE — 94729 DIFFUSING CAPACITY: CPT

## 2024-09-18 PROCEDURE — 71250 CT THORAX DX C-: CPT

## 2024-09-18 PROCEDURE — 94060 EVALUATION OF WHEEZING: CPT

## 2024-09-18 PROCEDURE — 94726 PLETHYSMOGRAPHY LUNG VOLUMES: CPT

## 2024-09-18 PROCEDURE — 94060 EVALUATION OF WHEEZING: CPT | Performed by: INTERNAL MEDICINE

## 2024-09-25 ENCOUNTER — OFFICE VISIT (OUTPATIENT)
Dept: PULMONOLOGY | Age: 39
End: 2024-09-25
Payer: COMMERCIAL

## 2024-09-25 VITALS
HEIGHT: 67 IN | DIASTOLIC BLOOD PRESSURE: 74 MMHG | SYSTOLIC BLOOD PRESSURE: 120 MMHG | OXYGEN SATURATION: 95 % | TEMPERATURE: 98.4 F | HEART RATE: 96 BPM | BODY MASS INDEX: 45.99 KG/M2 | WEIGHT: 293 LBS

## 2024-09-25 DIAGNOSIS — J84.9 INTERSTITIAL LUNG DISEASE (HCC): Primary | ICD-10-CM

## 2024-09-25 PROCEDURE — 1036F TOBACCO NON-USER: CPT | Performed by: INTERNAL MEDICINE

## 2024-09-25 PROCEDURE — G8427 DOCREV CUR MEDS BY ELIG CLIN: HCPCS | Performed by: INTERNAL MEDICINE

## 2024-09-25 PROCEDURE — 99213 OFFICE O/P EST LOW 20 MIN: CPT | Performed by: INTERNAL MEDICINE

## 2024-09-25 PROCEDURE — G8417 CALC BMI ABV UP PARAM F/U: HCPCS | Performed by: INTERNAL MEDICINE

## 2024-10-28 ENCOUNTER — OFFICE VISIT (OUTPATIENT)
Dept: FAMILY MEDICINE CLINIC | Age: 39
End: 2024-10-28
Payer: COMMERCIAL

## 2024-10-28 VITALS
WEIGHT: 293 LBS | OXYGEN SATURATION: 98 % | RESPIRATION RATE: 16 BRPM | DIASTOLIC BLOOD PRESSURE: 76 MMHG | SYSTOLIC BLOOD PRESSURE: 100 MMHG | HEART RATE: 86 BPM | HEIGHT: 67 IN | BODY MASS INDEX: 45.99 KG/M2 | TEMPERATURE: 97.3 F

## 2024-10-28 DIAGNOSIS — Z23 NEEDS FLU SHOT: ICD-10-CM

## 2024-10-28 DIAGNOSIS — R06.02 SOB (SHORTNESS OF BREATH) ON EXERTION: ICD-10-CM

## 2024-10-28 DIAGNOSIS — R60.0 EDEMA OF BOTH LOWER LEGS: Primary | ICD-10-CM

## 2024-10-28 DIAGNOSIS — R01.1 HEART MURMUR: ICD-10-CM

## 2024-10-28 DIAGNOSIS — J45.909 UNCOMPLICATED ASTHMA, UNSPECIFIED ASTHMA SEVERITY, UNSPECIFIED WHETHER PERSISTENT: ICD-10-CM

## 2024-10-28 LAB
BASOPHILS ABSOLUTE: 0.05 K/UL (ref 0–0.2)
BASOPHILS RELATIVE PERCENT: 1 % (ref 0–2)
EOSINOPHILS ABSOLUTE: 0.77 K/UL (ref 0.05–0.5)
EOSINOPHILS RELATIVE PERCENT: 9 % (ref 0–6)
HCT VFR BLD CALC: 37.4 % (ref 34–48)
HEMOGLOBIN: 11 G/DL (ref 11.5–15.5)
IMMATURE GRANULOCYTES %: 0 % (ref 0–5)
IMMATURE GRANULOCYTES ABSOLUTE: 0.03 K/UL (ref 0–0.58)
LYMPHOCYTES ABSOLUTE: 1.78 K/UL (ref 1.5–4)
LYMPHOCYTES RELATIVE PERCENT: 21 % (ref 20–42)
MCH RBC QN AUTO: 24.1 PG (ref 26–35)
MCHC RBC AUTO-ENTMCNC: 29.4 G/DL (ref 32–34.5)
MCV RBC AUTO: 81.8 FL (ref 80–99.9)
MONOCYTES ABSOLUTE: 0.49 K/UL (ref 0.1–0.95)
MONOCYTES RELATIVE PERCENT: 6 % (ref 2–12)
NEUTROPHILS ABSOLUTE: 5.48 K/UL (ref 1.8–7.3)
NEUTROPHILS RELATIVE PERCENT: 64 % (ref 43–80)
PDW BLD-RTO: 15.2 % (ref 11.5–15)
PLATELET # BLD: 252 K/UL (ref 130–450)
PMV BLD AUTO: 10.7 FL (ref 7–12)
RBC # BLD: 4.57 M/UL (ref 3.5–5.5)
WBC # BLD: 8.6 K/UL (ref 4.5–11.5)

## 2024-10-28 PROCEDURE — 99214 OFFICE O/P EST MOD 30 MIN: CPT | Performed by: NURSE PRACTITIONER

## 2024-10-28 PROCEDURE — 90661 CCIIV3 VAC ABX FR 0.5 ML IM: CPT | Performed by: NURSE PRACTITIONER

## 2024-10-28 PROCEDURE — G0008 ADMIN INFLUENZA VIRUS VAC: HCPCS | Performed by: NURSE PRACTITIONER

## 2024-10-28 PROCEDURE — G8417 CALC BMI ABV UP PARAM F/U: HCPCS | Performed by: NURSE PRACTITIONER

## 2024-10-28 PROCEDURE — 1036F TOBACCO NON-USER: CPT | Performed by: NURSE PRACTITIONER

## 2024-10-28 PROCEDURE — G8427 DOCREV CUR MEDS BY ELIG CLIN: HCPCS | Performed by: NURSE PRACTITIONER

## 2024-10-28 PROCEDURE — G8484 FLU IMMUNIZE NO ADMIN: HCPCS | Performed by: NURSE PRACTITIONER

## 2024-10-28 ASSESSMENT — ENCOUNTER SYMPTOMS
SHORTNESS OF BREATH: 1
COUGH: 0
WHEEZING: 0

## 2024-10-28 NOTE — ASSESSMENT & PLAN NOTE
Reviewed my notes from several visits and never noted heart murmur but today I do, check echo, referral to cardiology and labs today CMP, BNP

## 2024-10-28 NOTE — PROGRESS NOTES
maternal uncle, mother, paternal aunt, paternal grandmother, and paternal uncle; Early Death in her maternal uncle; Hearing Loss in her mother; Heart Attack in her maternal grandfather, maternal grandmother, and paternal grandmother; Heart Disease in her paternal aunt, paternal aunt, paternal grandmother, and paternal uncle; High Blood Pressure in her father, mother, paternal aunt, paternal aunt, paternal grandfather, paternal grandmother, paternal uncle, and paternal uncle; High Cholesterol in her father, mother, paternal aunt, paternal aunt, and paternal uncle; Learning Disabilities in her maternal cousin; Mental Retardation in her maternal cousin; Miscarriages / Stillbirths in her maternal grandmother, paternal aunt, and paternal grandmother; Obesity in her father, mother, paternal aunt, paternal aunt, and sister; Other in her father, maternal grandfather, mother, and paternal grandfather; Stroke in her paternal aunt and paternal grandmother.  I have reviewed July's allergies, medications, problem list, medical, social and family history and have updated as needed in the electronic medical record”    Review of Systems   Constitutional:  Positive for activity change. Negative for appetite change, chills, diaphoresis, fatigue, fever and unexpected weight change.   Respiratory:  Positive for shortness of breath. Negative for cough and wheezing.    Cardiovascular:  Positive for leg swelling. Negative for chest pain and palpitations.         OBJECTIVE:     VS:  Wt Readings from Last 3 Encounters:   10/28/24 (!) 168.3 kg (371 lb)   09/25/24 (!) 171.9 kg (379 lb)   08/14/24 (!) 175.5 kg (387 lb)                       Vitals:    10/28/24 1412   BP: 100/76   Pulse: 86   Resp: 16   Temp: 97.3 °F (36.3 °C)   TempSrc: Tympanic   SpO2: 98%   Weight: (!) 168.3 kg (371 lb)   Height: 1.702 m (5' 7.01\")       General: Alert and oriented to person, place, and time, well developed and well nourished, morbidly obese, in no acute

## 2024-10-28 NOTE — ASSESSMENT & PLAN NOTE
New since May she isn't very active and does sit a lot but has lost 12 pounds since July appt, She did use OTC diuretic and compression hose but didn't really help discussed this may just be secondary to her sitting as when she wakes up minimal edema.

## 2024-10-28 NOTE — ASSESSMENT & PLAN NOTE
New since May she isn't very active and does sit a lot but has lost 12 pounds since July appt, She did use OTC diuretic and compression hose but didn't really help discussed this may just be secondary to her sitting  and not very active as when she wakes up minimal edema. But noted murmur today

## 2024-10-30 LAB
ALLERGEN BERMUDA GRASS IGE: 0.6 KU/L (ref 0–0.34)
ALLERGEN BIRCH IGE: <0.1 KU/L (ref 0–0.34)
ALLERGEN DOG DANDER IGE: 2.72 KU/L (ref 0–0.34)
ALLERGEN GERMAN COCKROACH IGE: 0.2 KU/L (ref 0–0.34)
ALLERGEN HORMODENDRUM IGE: 0.13 KUL/L (ref 0–0.34)
ALLERGEN MOUSE EPITHELIA IGE: <0.1 KU/L (ref 0–0.34)
ALLERGEN OAK TREE IGE: <0.1 KU/L (ref 0–0.34)
ALLERGEN PECAN TREE IGE: 3.17 KU/L (ref 0–0.34)
ALLERGEN PIGWEED ROUGH IGE: 0.25 KU/L (ref 0–0.34)
ALLERGEN SHEEP SORREL (W18) IGE: <0.1 KU/L (ref 0–0.34)
ALLERGEN TREE SYCAMORE: <0.1 KU/L (ref 0–0.34)
ALLERGEN WALNUT TREE IGE: 0.55 KU/L (ref 0–0.34)
ALLERGEN WHITE MULBERRY TREE, IGE: <0.1 KU/L (ref 0–0.34)
ALLERGEN, TREE, WHITE ASH IGE: 0.14 KU/L (ref 0–0.34)
ALTERNARIA ALTERNATA: 1.39 KU/L (ref 0–0.34)
ASPERGILLUS FUMIGATUS: 1.15 KU/L (ref 0–0.34)
CAT DANDER ANTIBODY: 0.11 KU/L (ref 0–0.34)
COTTONWOOD TREE: <0.1 KU/L (ref 0–0.34)
D. FARINAE: >100 KU/L (ref 0–0.34)
D. PTERONYSSINUS: >100 KU/L (ref 0–0.34)
ELM TREE: <0.1 KU/L (ref 0–0.34)
IGE: 1413 IU/ML (ref 0–100)
MAPLE/BOXELDER TREE: <0.1 KU/L (ref 0–0.34)
MOUNTAIN CEDAR TREE: 0.12 KU/L (ref 0–0.34)
MUCOR RACEMOSUS: 0.16 KU/L (ref 0–0.34)
P. NOTATUM: 1.06 KU/L (ref 0–0.34)
RUSSIAN THISTLE: <0.1 KU/L (ref 0–0.34)
SHORT RAGWD(A ARTEMIS.) IGE: <0.1 KU/L (ref 0–0.34)
TIMOTHY GRASS: 5.94 KU/L (ref 0–0.34)

## 2024-12-03 ENCOUNTER — OFFICE VISIT (OUTPATIENT)
Dept: FAMILY MEDICINE CLINIC | Age: 39
End: 2024-12-03
Payer: COMMERCIAL

## 2024-12-03 VITALS
WEIGHT: 293 LBS | HEIGHT: 67 IN | HEART RATE: 83 BPM | BODY MASS INDEX: 45.99 KG/M2 | DIASTOLIC BLOOD PRESSURE: 74 MMHG | RESPIRATION RATE: 16 BRPM | OXYGEN SATURATION: 96 % | SYSTOLIC BLOOD PRESSURE: 126 MMHG | TEMPERATURE: 97.9 F

## 2024-12-03 DIAGNOSIS — M54.50 ACUTE LEFT-SIDED LOW BACK PAIN WITHOUT SCIATICA: ICD-10-CM

## 2024-12-03 DIAGNOSIS — R60.0 EDEMA OF BOTH LOWER LEGS: ICD-10-CM

## 2024-12-03 DIAGNOSIS — R76.8 ELEVATED IGE LEVEL: ICD-10-CM

## 2024-12-03 DIAGNOSIS — R06.02 SOB (SHORTNESS OF BREATH) ON EXERTION: ICD-10-CM

## 2024-12-03 DIAGNOSIS — R60.0 EDEMA OF BOTH LOWER LEGS: Primary | ICD-10-CM

## 2024-12-03 PROBLEM — R19.8 UMBILICUS DISCHARGE: Status: RESOLVED | Noted: 2024-07-22 | Resolved: 2024-12-03

## 2024-12-03 PROBLEM — Z23 NEEDS FLU SHOT: Status: RESOLVED | Noted: 2022-07-25 | Resolved: 2024-12-03

## 2024-12-03 PROBLEM — R11.2 NAUSEA AND VOMITING IN ADULT: Status: RESOLVED | Noted: 2023-06-12 | Resolved: 2024-12-03

## 2024-12-03 LAB
ALBUMIN: 3.6 G/DL (ref 3.5–5.2)
ALP BLD-CCNC: 78 U/L (ref 35–104)
ALT SERPL-CCNC: 16 U/L (ref 0–32)
ANION GAP SERPL CALCULATED.3IONS-SCNC: 9 MMOL/L (ref 7–16)
AST SERPL-CCNC: 17 U/L (ref 0–31)
BILIRUB SERPL-MCNC: 0.7 MG/DL (ref 0–1.2)
BUN BLDV-MCNC: 13 MG/DL (ref 6–20)
CALCIUM SERPL-MCNC: 8.7 MG/DL (ref 8.6–10.2)
CHLORIDE BLD-SCNC: 108 MMOL/L (ref 98–107)
CO2: 25 MMOL/L (ref 22–29)
CREAT SERPL-MCNC: 0.7 MG/DL (ref 0.5–1)
GFR, ESTIMATED: >90 ML/MIN/1.73M2
GLUCOSE BLD-MCNC: 79 MG/DL (ref 74–99)
NT PRO BNP: 608 PG/ML (ref 0–125)
POTASSIUM SERPL-SCNC: 3.8 MMOL/L (ref 3.5–5)
SODIUM BLD-SCNC: 142 MMOL/L (ref 132–146)
TOTAL PROTEIN: 6.1 G/DL (ref 6.4–8.3)

## 2024-12-03 PROCEDURE — 1036F TOBACCO NON-USER: CPT | Performed by: NURSE PRACTITIONER

## 2024-12-03 PROCEDURE — 99214 OFFICE O/P EST MOD 30 MIN: CPT | Performed by: NURSE PRACTITIONER

## 2024-12-03 PROCEDURE — G8417 CALC BMI ABV UP PARAM F/U: HCPCS | Performed by: NURSE PRACTITIONER

## 2024-12-03 PROCEDURE — G8427 DOCREV CUR MEDS BY ELIG CLIN: HCPCS | Performed by: NURSE PRACTITIONER

## 2024-12-03 PROCEDURE — G8484 FLU IMMUNIZE NO ADMIN: HCPCS | Performed by: NURSE PRACTITIONER

## 2024-12-03 ASSESSMENT — ENCOUNTER SYMPTOMS
VOICE CHANGE: 0
DIARRHEA: 0
COLOR CHANGE: 0
WHEEZING: 0
ABDOMINAL PAIN: 0
SHORTNESS OF BREATH: 1
NAUSEA: 0
RHINORRHEA: 0
CHEST TIGHTNESS: 0
SORE THROAT: 0
BACK PAIN: 1
SINUS PAIN: 0
SINUS PRESSURE: 0
TROUBLE SWALLOWING: 0
VOMITING: 0
COUGH: 1
FACIAL SWELLING: 0
CONSTIPATION: 0

## 2024-12-03 NOTE — PATIENT INSTRUCTIONS
The medication list included in this document is our record of what you are currently taking, including any changes that were made at today's visit.  If you find any differences when compared to your medications at home, or have any questions that were not answered at your visit, please contact the office.The medication list included in this document is our record of what you are currently taking, including any changes that were made at today's visit.  If you find any differences when compared to your medications at home, or have any questions that were not answered at your visit, please contact the office.

## 2024-12-03 NOTE — ASSESSMENT & PLAN NOTE
Improved with increased activity when they were moving. She has also lost 5 pounds since her last visit a month ago. Discussed gradually increase her walking until she can get 30 minutes 5 days a week. Inadvertently when she had her labs done the lab didn't drawn the BNP and CMP. Sent to the lab to have drawn

## 2024-12-03 NOTE — ASSESSMENT & PLAN NOTE
New was packing and moving to a different apartment and after they were done on Saturday she sat down and left lower back pain. She declines steroid states it makes her crazy. Recommend using heat and picking up the Salon Pas but do not put heat over these, she can also  the deep penetration which is an 8% menthyl cream and apply to the area 2 - 3 times a day but do not put heat over the cream to avoid skin burn.

## 2024-12-03 NOTE — ASSESSMENT & PLAN NOTE
New noted on respiratory allergen panel as well as some allergies to dust mites, dog dander, 2 trees and fungus she will call DR Guajardo's office for guidance as I am unsure if he treats allergies or if she will need to see allergist.

## 2024-12-03 NOTE — PROGRESS NOTES
OFFICE PROGRESS NOTE  [facility] [place] [facility]  [address] [place] [address]  Dept: [phone]   Chief Complaint   Patient presents with    Follow-up    Leg Swelling    Lower Back Pain       ASSESSMENT/PLAN   1. Edema of both lower legs  Assessment & Plan:  Improved with increased activity when they were moving. She has also lost 5 pounds since her last visit a month ago. Discussed gradually increase her walking until she can get 30 minutes 5 days a week. Inadvertently when she had her labs done the lab didn't drawn the BNP and CMP. Sent to the lab to have drawn  2. Acute left-sided low back pain without sciatica  Assessment & Plan:  New was packing and moving to a different apartment and after they were done on Saturday she sat down and left lower back pain. She declines steroid states it makes her crazy. Recommend using heat and picking up the Salon Pas but do not put heat over these, she can also  the deep penetration which is an 8% menthyl cream and apply to the area 2 - 3 times a day but do not put heat over the cream to avoid skin burn.   3. Elevated IgE level  Assessment & Plan:  New noted on respiratory allergen panel as well as some allergies to dust mites, dog dander, 2 trees and fungus she will call DR Guajardo's office for guidance as I am unsure if he treats allergies or if she will need to see allergist.        Reviewed labs: CBCD, respiratory allergen +,  in epic from DR Guajardo 10/28/24, inadvertently when she had labs the CMP and BNP were not drawn will have done today.     Reviewed notes from DR Guajardo 9/5/24  Reviewed radiology     Discussed reducing caffeine intake, Discussed smoking cessation, Discussed weight loss, Discussed exercising 30 minutes daily, Discussed limit alcohol to one drink per day, and Discussed taking medications as directed and adverse effects    Return for keep appt with Dr Cuba.     HPI:   Here today for follow up on leg

## 2025-01-07 ENCOUNTER — OFFICE VISIT (OUTPATIENT)
Dept: CARDIOLOGY CLINIC | Age: 40
End: 2025-01-07
Payer: MEDICARE

## 2025-01-07 VITALS
SYSTOLIC BLOOD PRESSURE: 110 MMHG | RESPIRATION RATE: 18 BRPM | HEIGHT: 67 IN | DIASTOLIC BLOOD PRESSURE: 80 MMHG | WEIGHT: 293 LBS | BODY MASS INDEX: 45.99 KG/M2 | HEART RATE: 99 BPM

## 2025-01-07 DIAGNOSIS — R01.1 HEART MURMUR: Primary | ICD-10-CM

## 2025-01-07 PROCEDURE — 93000 ELECTROCARDIOGRAM COMPLETE: CPT | Performed by: INTERNAL MEDICINE

## 2025-01-07 PROCEDURE — M1308 PR FLU IMMUNIZE NO ADMIN: HCPCS | Performed by: INTERNAL MEDICINE

## 2025-01-07 PROCEDURE — G8417 CALC BMI ABV UP PARAM F/U: HCPCS | Performed by: INTERNAL MEDICINE

## 2025-01-07 PROCEDURE — G8427 DOCREV CUR MEDS BY ELIG CLIN: HCPCS | Performed by: INTERNAL MEDICINE

## 2025-01-07 PROCEDURE — 1036F TOBACCO NON-USER: CPT | Performed by: INTERNAL MEDICINE

## 2025-01-07 PROCEDURE — 99202 OFFICE O/P NEW SF 15 MIN: CPT | Performed by: INTERNAL MEDICINE

## 2025-01-07 ASSESSMENT — ENCOUNTER SYMPTOMS
WHEEZING: 0
SHORTNESS OF BREATH: 0
BLOOD IN STOOL: 0
ABDOMINAL PAIN: 0
VOMITING: 0
CONSTIPATION: 0
NAUSEA: 0
BACK PAIN: 0
DIARRHEA: 0
COUGH: 0

## 2025-01-20 SDOH — HEALTH STABILITY: PHYSICAL HEALTH: ON AVERAGE, HOW MANY DAYS PER WEEK DO YOU ENGAGE IN MODERATE TO STRENUOUS EXERCISE (LIKE A BRISK WALK)?: 0 DAYS

## 2025-01-23 ENCOUNTER — OFFICE VISIT (OUTPATIENT)
Dept: FAMILY MEDICINE CLINIC | Age: 40
End: 2025-01-23

## 2025-01-23 VITALS
WEIGHT: 293 LBS | HEART RATE: 86 BPM | HEIGHT: 67 IN | SYSTOLIC BLOOD PRESSURE: 110 MMHG | OXYGEN SATURATION: 95 % | BODY MASS INDEX: 45.99 KG/M2 | TEMPERATURE: 97.8 F | DIASTOLIC BLOOD PRESSURE: 78 MMHG

## 2025-01-23 DIAGNOSIS — D64.9 ANEMIA, UNSPECIFIED TYPE: ICD-10-CM

## 2025-01-23 DIAGNOSIS — F60.3 BORDERLINE PERSONALITY DISORDER (HCC): Primary | ICD-10-CM

## 2025-01-23 DIAGNOSIS — F41.9 ANXIETY: ICD-10-CM

## 2025-01-23 DIAGNOSIS — K29.30 CHRONIC SUPERFICIAL GASTRITIS WITHOUT BLEEDING: ICD-10-CM

## 2025-01-23 DIAGNOSIS — J84.9 INTERSTITIAL LUNG DISEASE (HCC): ICD-10-CM

## 2025-01-23 DIAGNOSIS — G47.33 OSA (OBSTRUCTIVE SLEEP APNEA): ICD-10-CM

## 2025-01-23 DIAGNOSIS — F43.10 POST TRAUMATIC STRESS DISORDER (PTSD): ICD-10-CM

## 2025-01-23 DIAGNOSIS — F31.9 BIPOLAR DEPRESSION (HCC): ICD-10-CM

## 2025-01-23 DIAGNOSIS — J45.30 MILD PERSISTENT ASTHMA WITHOUT COMPLICATION: ICD-10-CM

## 2025-01-23 PROBLEM — Z23 NEED FOR DIPHTHERIA-TETANUS-PERTUSSIS (TDAP) VACCINE: Status: RESOLVED | Noted: 2023-01-23 | Resolved: 2025-01-23

## 2025-01-23 RX ORDER — FAMOTIDINE 20 MG/1
20 TABLET, FILM COATED ORAL AS NEEDED
COMMUNITY

## 2025-01-23 SDOH — ECONOMIC STABILITY: FOOD INSECURITY: WITHIN THE PAST 12 MONTHS, YOU WORRIED THAT YOUR FOOD WOULD RUN OUT BEFORE YOU GOT MONEY TO BUY MORE.: SOMETIMES TRUE

## 2025-01-23 SDOH — ECONOMIC STABILITY: FOOD INSECURITY: WITHIN THE PAST 12 MONTHS, THE FOOD YOU BOUGHT JUST DIDN'T LAST AND YOU DIDN'T HAVE MONEY TO GET MORE.: NEVER TRUE

## 2025-01-23 ASSESSMENT — PATIENT HEALTH QUESTIONNAIRE - PHQ9
SUM OF ALL RESPONSES TO PHQ QUESTIONS 1-9: 9
3. TROUBLE FALLING OR STAYING ASLEEP: SEVERAL DAYS
2. FEELING DOWN, DEPRESSED OR HOPELESS: MORE THAN HALF THE DAYS
7. TROUBLE CONCENTRATING ON THINGS, SUCH AS READING THE NEWSPAPER OR WATCHING TELEVISION: SEVERAL DAYS
6. FEELING BAD ABOUT YOURSELF - OR THAT YOU ARE A FAILURE OR HAVE LET YOURSELF OR YOUR FAMILY DOWN: MORE THAN HALF THE DAYS
9. THOUGHTS THAT YOU WOULD BE BETTER OFF DEAD, OR OF HURTING YOURSELF: NOT AT ALL
5. POOR APPETITE OR OVEREATING: NOT AT ALL
1. LITTLE INTEREST OR PLEASURE IN DOING THINGS: MORE THAN HALF THE DAYS
4. FEELING TIRED OR HAVING LITTLE ENERGY: SEVERAL DAYS
SUM OF ALL RESPONSES TO PHQ QUESTIONS 1-9: 9
10. IF YOU CHECKED OFF ANY PROBLEMS, HOW DIFFICULT HAVE THESE PROBLEMS MADE IT FOR YOU TO DO YOUR WORK, TAKE CARE OF THINGS AT HOME, OR GET ALONG WITH OTHER PEOPLE: EXTREMELY DIFFICULT
SUM OF ALL RESPONSES TO PHQ QUESTIONS 1-9: 9
SUM OF ALL RESPONSES TO PHQ9 QUESTIONS 1 & 2: 4
SUM OF ALL RESPONSES TO PHQ QUESTIONS 1-9: 9
8. MOVING OR SPEAKING SO SLOWLY THAT OTHER PEOPLE COULD HAVE NOTICED. OR THE OPPOSITE, BEING SO FIGETY OR RESTLESS THAT YOU HAVE BEEN MOVING AROUND A LOT MORE THAN USUAL: NOT AT ALL

## 2025-01-23 NOTE — PROGRESS NOTES
[facility]  [address], [place] [address]  Phone: [phone]        Patient:  July Alvarado 39 y.o. female          Chief complaint:   Chief Complaint   Patient presents with    New Patient     Establishing care with a new provider.       Assessment and Plan     July \"Leatha" was seen today for new patient.    Diagnoses and all orders for this visit:    Borderline personality disorder (HCC)  Bipolar depression (HCC)  Post traumatic stress disorder (PTSD)  Anxiety  Chronic and stable  Following a psych care  Continue medications per specialty    Mild persistent asthma without complication  Interstitial lung disease (HCC)  Chronic and stable  Continue follow-up with pulmonology, Dr. Guajardo  Continue inhalers per specialty    KAUR (obstructive sleep apnea)  Chronic and not well-controlled  Compliance with CPAP recommended  Plan to follow-up with sleep medicine    Reflux  Chronic nausea and vomiting  Chronic and not well-controlled  Continue PPI  Recommend follow-up with GI  Consideration for cyclic vomiting syndrome in relation to chronic cannabis use, using edibles currently  Consideration of side effect from psych medications    Anemia:  Chronic and not controlled  Suspect either in relation to her chronic gastritis versus uterine fibroid  Compliance with oral iron supplement  Follow-up with gynecology for Pap and follow-up on fibroid  Has had C-scope  Continue to monitor with labs      Please see Patient Instructions for further counseling and information given.        Advised to please be adherent to the treatment plans discussed today, and please call with any questions or concerns, letting the office know of any reasons that the plans may not be followed.  The risks of untreated conditions include worsening illness, injury, disability, and possibly, death. Please call if symptoms change in any way, worsen, or fail to completely resolve, as this could necessitate a change

## 2025-01-24 ENCOUNTER — HOSPITAL ENCOUNTER (OUTPATIENT)
Dept: CARDIOLOGY | Age: 40
Discharge: HOME | End: 2025-01-26
Attending: INTERNAL MEDICINE
Payer: MEDICARE

## 2025-01-24 VITALS — HEIGHT: 67 IN | WEIGHT: 293 LBS | BODY MASS INDEX: 45.99 KG/M2

## 2025-01-24 DIAGNOSIS — R01.1 HEART MURMUR: ICD-10-CM

## 2025-01-24 LAB
ECHO AO ASC DIAM: 2.6 CM
ECHO AO ASCENDING AORTA INDEX: 1 CM/M2
ECHO AV CUSP MM: 1.7 CM
ECHO AV MEAN GRADIENT: 5 MMHG
ECHO AV MEAN VELOCITY: 1.1 M/S
ECHO AV PEAK GRADIENT: 7 MMHG
ECHO AV PEAK VELOCITY: 1.3 M/S
ECHO AV VELOCITY RATIO: 0.77
ECHO AV VTI: 28.7 CM
ECHO BSA: 2.79 M2
ECHO LA DIAMETER INDEX: 1.65 CM/M2
ECHO LA DIAMETER: 4.3 CM
ECHO LA VOL A-L A2C: 75 ML (ref 22–52)
ECHO LA VOL A-L A4C: 76 ML (ref 22–52)
ECHO LA VOL MOD A2C: 72 ML (ref 22–52)
ECHO LA VOL MOD A4C: 68 ML (ref 22–52)
ECHO LA VOLUME AREA LENGTH: 76 ML
ECHO LA VOLUME INDEX A-L A2C: 29 ML/M2 (ref 16–34)
ECHO LA VOLUME INDEX A-L A4C: 29 ML/M2 (ref 16–34)
ECHO LA VOLUME INDEX AREA LENGTH: 29 ML/M2 (ref 16–34)
ECHO LA VOLUME INDEX MOD A2C: 28 ML/M2 (ref 16–34)
ECHO LA VOLUME INDEX MOD A4C: 26 ML/M2 (ref 16–34)
ECHO LV EF PHYSICIAN: 65 %
ECHO LV FRACTIONAL SHORTENING: 31 % (ref 28–44)
ECHO LV INTERNAL DIMENSION DIASTOLE INDEX: 2.11 CM/M2
ECHO LV INTERNAL DIMENSION DIASTOLIC: 5.5 CM (ref 3.9–5.3)
ECHO LV INTERNAL DIMENSION SYSTOLIC INDEX: 1.46 CM/M2
ECHO LV INTERNAL DIMENSION SYSTOLIC: 3.8 CM
ECHO LV ISOVOLUMETRIC RELAXATION TIME (IVRT): 65.7 MS
ECHO LV IVSD: 1.4 CM (ref 0.6–0.9)
ECHO LV IVSS: 2 CM
ECHO LV MASS 2D: 320.9 G (ref 67–162)
ECHO LV MASS INDEX 2D: 123 G/M2 (ref 43–95)
ECHO LV POSTERIOR WALL DIASTOLIC: 1.3 CM (ref 0.6–0.9)
ECHO LV POSTERIOR WALL SYSTOLIC: 1.7 CM
ECHO LV RELATIVE WALL THICKNESS RATIO: 0.47
ECHO LVOT AV VTI INDEX: 0.73
ECHO LVOT MEAN GRADIENT: 3 MMHG
ECHO LVOT PEAK GRADIENT: 4 MMHG
ECHO LVOT PEAK VELOCITY: 1 M/S
ECHO LVOT VTI: 21 CM
ECHO MV "A" WAVE DURATION: 148.8 MSEC
ECHO MV A VELOCITY: 1.01 M/S
ECHO MV AREA PHT: 3.1 CM2
ECHO MV E DECELERATION TIME (DT): 240.1 MS
ECHO MV E VELOCITY: 1.65 M/S
ECHO MV E/A RATIO: 1.63
ECHO MV EROA PISA: 0.3 CM2
ECHO MV LVOT VTI INDEX: 1.93
ECHO MV MAX VELOCITY: 2.1 M/S
ECHO MV MEAN GRADIENT: 8 MMHG
ECHO MV MEAN VELOCITY: 1.4 M/S
ECHO MV PEAK GRADIENT: 18 MMHG
ECHO MV PRESSURE HALF TIME (PHT): 70.1 MS
ECHO MV REGURGITANT ALIASING (NYQUIST) VELOCITY: 18 CM/S
ECHO MV REGURGITANT RADIUS PISA: 1.25 CM
ECHO MV REGURGITANT VELOCITY PISA: 5.4 M/S
ECHO MV REGURGITANT VOLUME PISA: 55.34 ML
ECHO MV REGURGITANT VTIA: 169.2 CM
ECHO MV VTI: 40.6 CM
ECHO PULMONARY ARTERY SYSTOLIC PRESSURE (PASP): 50 MMHG
ECHO PV MAX VELOCITY: 0.9 M/S
ECHO PV MEAN GRADIENT: 2 MMHG
ECHO PV MEAN VELOCITY: 0.7 M/S
ECHO PV PEAK GRADIENT: 3 MMHG
ECHO PV VTI: 18.6 CM
ECHO PVEIN A DURATION: 103.8 MS
ECHO PVEIN A VELOCITY: 0.3 M/S
ECHO PVEIN PEAK D VELOCITY: 1 M/S
ECHO PVEIN PEAK S VELOCITY: 0.7 M/S
ECHO PVEIN S/D RATIO: 0.7
ECHO RV INTERNAL DIMENSION: 2.6 CM
ECHO RV TAPSE: 2.2 CM (ref 1.7–?)
ECHO TV REGURGITANT MAX VELOCITY: 3.44 M/S
ECHO TV REGURGITANT PEAK GRADIENT: 47 MMHG

## 2025-01-24 PROCEDURE — 93306 TTE W/DOPPLER COMPLETE: CPT | Performed by: INTERNAL MEDICINE

## 2025-01-24 PROCEDURE — 93306 TTE W/DOPPLER COMPLETE: CPT

## 2025-03-31 ENCOUNTER — HOSPITAL ENCOUNTER (OUTPATIENT)
Dept: CT IMAGING | Age: 40
Discharge: HOME | End: 2025-03-31
Attending: INTERNAL MEDICINE
Payer: MEDICARE

## 2025-03-31 DIAGNOSIS — J84.9 INTERSTITIAL LUNG DISEASE (HCC): ICD-10-CM

## 2025-03-31 PROCEDURE — 71250 CT THORAX DX C-: CPT

## 2025-04-21 ENCOUNTER — OFFICE VISIT (OUTPATIENT)
Dept: PULMONOLOGY | Age: 40
End: 2025-04-21
Payer: MEDICARE

## 2025-04-21 VITALS
TEMPERATURE: 98.4 F | BODY MASS INDEX: 45.99 KG/M2 | HEART RATE: 86 BPM | DIASTOLIC BLOOD PRESSURE: 74 MMHG | WEIGHT: 293 LBS | OXYGEN SATURATION: 96 % | SYSTOLIC BLOOD PRESSURE: 110 MMHG | HEIGHT: 67 IN

## 2025-04-21 DIAGNOSIS — J84.9 INTERSTITIAL LUNG DISEASE (HCC): Primary | ICD-10-CM

## 2025-04-21 DIAGNOSIS — J84.9 INTERSTITIAL LUNG DISEASE (HCC): ICD-10-CM

## 2025-04-21 PROCEDURE — G8427 DOCREV CUR MEDS BY ELIG CLIN: HCPCS | Performed by: INTERNAL MEDICINE

## 2025-04-21 PROCEDURE — 1036F TOBACCO NON-USER: CPT | Performed by: INTERNAL MEDICINE

## 2025-04-21 PROCEDURE — 99214 OFFICE O/P EST MOD 30 MIN: CPT | Performed by: INTERNAL MEDICINE

## 2025-04-21 PROCEDURE — G8417 CALC BMI ABV UP PARAM F/U: HCPCS | Performed by: INTERNAL MEDICINE

## 2025-04-21 NOTE — PROGRESS NOTES
Patient to follow up with physician in 4 months. Orders for blood work were given to the patient to have done today. Referral to [facility] with [facility] will be sent.   
TABS 325) 325 (65 Fe) MG EC tablet, Take 1 tablet by mouth 3 times daily (with meals) (Patient not taking: Reported on 4/21/2025), Disp: 90 tablet, Rfl: 3    budesonide-formoterol (SYMBICORT) 160-4.5 MCG/ACT AERO, Inhale 2 puffs into the lungs 2 times daily (Patient not taking: Reported on 4/21/2025), Disp: 3 each, Rfl: 1     I reviewed the patient's past medical and surgical history, Medications and Allergies.    Patient seen today for: ILD and Results       Review of Systems      Physical Exam:  Vitals:    04/21/25 1405   BP: 110/74   Pulse: 86   Temp: 98.4 °F (36.9 °C)   SpO2: 96%   Weight: (!) 164.2 kg (362 lb)   Height: 1.702 m (5' 7\")       Physical Exam  Constitutional:       Appearance: Normal appearance. She is obese.   HENT:      Head: Normocephalic and atraumatic.      Nose: Nose normal.      Mouth/Throat:      Mouth: Mucous membranes are moist.      Pharynx: Oropharynx is clear.   Eyes:      Conjunctiva/sclera: Conjunctivae normal.      Pupils: Pupils are equal, round, and reactive to light.   Cardiovascular:      Rate and Rhythm: Normal rate and regular rhythm.      Pulses: Normal pulses.      Heart sounds: Normal heart sounds.   Pulmonary:      Effort: Pulmonary effort is normal.      Breath sounds: Normal breath sounds.   Abdominal:      General: Bowel sounds are normal.      Palpations: Abdomen is soft.   Musculoskeletal:         General: Normal range of motion.      Cervical back: Normal range of motion and neck supple.   Skin:     General: Skin is warm.   Neurological:      General: No focal deficit present.      Mental Status: She is alert and oriented to person, place, and time.          Assessment/Plan:   Diagnosis Orders   1. Interstitial lung disease (HCC)          Chest CT, March 2025 : 1. Somewhat heterogeneous mosaicism throughout the bilateral lungs on all  sequences with no focal consolidation. No subpleural reticulation or  honeycombing to suggest fibrotic change.  Favored considerations

## 2025-04-29 LAB
SEND OUT REPORT: NORMAL
TEST NAME: NORMAL

## 2025-07-20 SDOH — HEALTH STABILITY: PHYSICAL HEALTH: ON AVERAGE, HOW MANY DAYS PER WEEK DO YOU ENGAGE IN MODERATE TO STRENUOUS EXERCISE (LIKE A BRISK WALK)?: 1 DAY

## 2025-07-20 SDOH — HEALTH STABILITY: PHYSICAL HEALTH: ON AVERAGE, HOW MANY MINUTES DO YOU ENGAGE IN EXERCISE AT THIS LEVEL?: 10 MIN

## 2025-07-20 ASSESSMENT — PATIENT HEALTH QUESTIONNAIRE - PHQ9
1. LITTLE INTEREST OR PLEASURE IN DOING THINGS: SEVERAL DAYS
SUM OF ALL RESPONSES TO PHQ QUESTIONS 1-9: 10
5. POOR APPETITE OR OVEREATING: MORE THAN HALF THE DAYS
4. FEELING TIRED OR HAVING LITTLE ENERGY: SEVERAL DAYS
3. TROUBLE FALLING OR STAYING ASLEEP: SEVERAL DAYS
SUM OF ALL RESPONSES TO PHQ QUESTIONS 1-9: 10
7. TROUBLE CONCENTRATING ON THINGS, SUCH AS READING THE NEWSPAPER OR WATCHING TELEVISION: SEVERAL DAYS
SUM OF ALL RESPONSES TO PHQ QUESTIONS 1-9: 10
10. IF YOU CHECKED OFF ANY PROBLEMS, HOW DIFFICULT HAVE THESE PROBLEMS MADE IT FOR YOU TO DO YOUR WORK, TAKE CARE OF THINGS AT HOME, OR GET ALONG WITH OTHER PEOPLE: VERY DIFFICULT
2. FEELING DOWN, DEPRESSED OR HOPELESS: MORE THAN HALF THE DAYS
SUM OF ALL RESPONSES TO PHQ QUESTIONS 1-9: 10
9. THOUGHTS THAT YOU WOULD BE BETTER OFF DEAD, OR OF HURTING YOURSELF: NOT AT ALL
6. FEELING BAD ABOUT YOURSELF - OR THAT YOU ARE A FAILURE OR HAVE LET YOURSELF OR YOUR FAMILY DOWN: MORE THAN HALF THE DAYS
8. MOVING OR SPEAKING SO SLOWLY THAT OTHER PEOPLE COULD HAVE NOTICED. OR THE OPPOSITE, BEING SO FIGETY OR RESTLESS THAT YOU HAVE BEEN MOVING AROUND A LOT MORE THAN USUAL: NOT AT ALL

## 2025-07-20 ASSESSMENT — LIFESTYLE VARIABLES
HOW OFTEN DO YOU HAVE A DRINK CONTAINING ALCOHOL: 1
HOW MANY STANDARD DRINKS CONTAINING ALCOHOL DO YOU HAVE ON A TYPICAL DAY: 0
HOW OFTEN DO YOU HAVE SIX OR MORE DRINKS ON ONE OCCASION: 1
HOW OFTEN DO YOU HAVE A DRINK CONTAINING ALCOHOL: NEVER
HOW MANY STANDARD DRINKS CONTAINING ALCOHOL DO YOU HAVE ON A TYPICAL DAY: PATIENT DOES NOT DRINK

## 2025-07-23 ENCOUNTER — OFFICE VISIT (OUTPATIENT)
Dept: FAMILY MEDICINE CLINIC | Age: 40
End: 2025-07-23
Payer: MEDICARE

## 2025-07-23 VITALS
WEIGHT: 293 LBS | SYSTOLIC BLOOD PRESSURE: 108 MMHG | OXYGEN SATURATION: 100 % | BODY MASS INDEX: 45.99 KG/M2 | TEMPERATURE: 97.9 F | HEART RATE: 101 BPM | DIASTOLIC BLOOD PRESSURE: 68 MMHG | RESPIRATION RATE: 20 BRPM | HEIGHT: 67 IN

## 2025-07-23 DIAGNOSIS — I27.20 PULMONARY HTN (HCC): ICD-10-CM

## 2025-07-23 DIAGNOSIS — Z00.00 INITIAL MEDICARE ANNUAL WELLNESS VISIT: Primary | ICD-10-CM

## 2025-07-23 DIAGNOSIS — F31.9 BIPOLAR DEPRESSION (HCC): ICD-10-CM

## 2025-07-23 DIAGNOSIS — D50.8 IRON DEFICIENCY ANEMIA SECONDARY TO INADEQUATE DIETARY IRON INTAKE: ICD-10-CM

## 2025-07-23 DIAGNOSIS — J84.9 INTERSTITIAL LUNG DISEASE (HCC): ICD-10-CM

## 2025-07-23 PROCEDURE — 1036F TOBACCO NON-USER: CPT | Performed by: FAMILY MEDICINE

## 2025-07-23 PROCEDURE — G8427 DOCREV CUR MEDS BY ELIG CLIN: HCPCS | Performed by: FAMILY MEDICINE

## 2025-07-23 PROCEDURE — 99214 OFFICE O/P EST MOD 30 MIN: CPT | Performed by: FAMILY MEDICINE

## 2025-07-23 PROCEDURE — G0438 PPPS, INITIAL VISIT: HCPCS | Performed by: FAMILY MEDICINE

## 2025-07-23 PROCEDURE — G8417 CALC BMI ABV UP PARAM F/U: HCPCS | Performed by: FAMILY MEDICINE

## 2025-07-23 RX ORDER — FERROUS SULFATE 325(65) MG
325 TABLET, DELAYED RELEASE (ENTERIC COATED) ORAL
Qty: 90 TABLET | Refills: 3 | Status: SHIPPED | OUTPATIENT
Start: 2025-07-23

## 2025-07-23 NOTE — PATIENT INSTRUCTIONS
things you can do to protect your heart. It is never too late to quit. Try to avoid secondhand smoke too.     Stay at a weight that's healthy for you. Talk to your doctor if you need help losing weight.     Try to get 7 to 9 hours of sleep each night.     Limit alcohol to 2 drinks a day for men and 1 drink a day for women. Too much alcohol can cause health problems.     Manage other health problems such as diabetes, high blood pressure, and high cholesterol. If you think you may have a problem with alcohol or drug use, talk to your doctor.   Medicines    Take your medicines exactly as prescribed. Call your doctor if you think you are having a problem with your medicine.     If your doctor recommends aspirin, take the amount directed each day. Make sure you take aspirin and not another kind of pain reliever, such as acetaminophen (Tylenol).   When should you call for help?   Call 911 if you have symptoms of a heart attack. These may include:    Chest pain or pressure, or a strange feeling in the chest.     Sweating.     Shortness of breath.     Pain, pressure, or a strange feeling in the back, neck, jaw, or upper belly or in one or both shoulders or arms.     Lightheadedness or sudden weakness.     A fast or irregular heartbeat.   After you call 911, the  may tell you to chew 1 adult-strength or 2 to 4 low-dose aspirin. Wait for an ambulance. Do not try to drive yourself.  Watch closely for changes in your health, and be sure to contact your doctor if you have any problems.  Where can you learn more?  Go to https://www.[email or web address].net/patientEd and enter F075 to learn more about \"A Healthy Heart: Care Instructions.\"  Current as of: July 31, 2024  Content Version: 14.5  © 0065-5921 [employer].   Care instructions adapted under license by [employer]. If you have questions about a medical condition or this instruction, always ask your healthcare professional. [employer] disclaims any

## 2025-07-23 NOTE — PROGRESS NOTES
Weight: (!) 171.1 kg (377 lb 3.2 oz)   Height: 1.702 m (5' 7.01\")      Body mass index is 59.06 kg/m².      Physical Exam  Constitutional:       Appearance: Normal appearance.   Eyes:      Extraocular Movements: Extraocular movements intact.      Conjunctiva/sclera: Conjunctivae normal.   Cardiovascular:      Rate and Rhythm: Normal rate and regular rhythm.      Heart sounds: No murmur heard.     No friction rub. No gallop.   Pulmonary:      Effort: Pulmonary effort is normal.      Breath sounds: Normal breath sounds.      Comments: 2L o2  Abdominal:      General: Abdomen is flat.      Palpations: Abdomen is soft.   Musculoskeletal:         General: No swelling or tenderness.      Cervical back: Normal range of motion and neck supple.      Right lower leg: No edema.      Left lower leg: No edema.   Skin:     General: Skin is warm and dry.   Neurological:      Mental Status: She is alert and oriented to person, place, and time. Mental status is at baseline.   Psychiatric:         Mood and Affect: Mood normal.         Thought Content: Thought content normal.                   Allergies   Allergen Reactions    Latex Hives    Environmental/Seasonal      Prior to Visit Medications    Medication Sig Taking? Authorizing Provider   ferrous sulfate (FE TABS 325) 325 (65 Fe) MG EC tablet Take 1 tablet by mouth daily (with breakfast) Yes Karrie Cuba MD   famotidine (PEPCID) 20 MG tablet Take 1 tablet by mouth as needed Yes Provider, MD Concha   albuterol sulfate HFA (PROVENTIL HFA) 108 (90 Base) MCG/ACT inhaler Inhale 2 puffs into the lungs every 4 hours as needed for Wheezing Yes Alma Medina APRN - CNP   budesonide-formoterol (SYMBICORT) 160-4.5 MCG/ACT AERO Inhale 2 puffs into the lungs 2 times daily Yes Alma Medina APRN - CNP   fluticasone (FLONASE) 50 MCG/ACT nasal spray 2 sprays by Each Nostril route daily Yes Alma Medina APRN - CNP   buPROPion (WELLBUTRIN SR) 100 MG extended release tablet daily  Female